# Patient Record
Sex: MALE | Race: WHITE | NOT HISPANIC OR LATINO | Employment: OTHER | URBAN - METROPOLITAN AREA
[De-identification: names, ages, dates, MRNs, and addresses within clinical notes are randomized per-mention and may not be internally consistent; named-entity substitution may affect disease eponyms.]

---

## 2020-01-23 ENCOUNTER — RECORDS - HEALTHEAST (OUTPATIENT)
Dept: LAB | Facility: CLINIC | Age: 65
End: 2020-01-23

## 2020-01-23 LAB
ALBUMIN SERPL-MCNC: 3.6 G/DL (ref 3.5–5)
ALP SERPL-CCNC: 101 U/L (ref 45–120)
ALT SERPL W P-5'-P-CCNC: 31 U/L (ref 0–45)
ANION GAP SERPL CALCULATED.3IONS-SCNC: 8 MMOL/L (ref 5–18)
AST SERPL W P-5'-P-CCNC: 23 U/L (ref 0–40)
BILIRUB SERPL-MCNC: 0.9 MG/DL (ref 0–1)
BUN SERPL-MCNC: 14 MG/DL (ref 8–22)
CALCIUM SERPL-MCNC: 8.9 MG/DL (ref 8.5–10.5)
CHLORIDE BLD-SCNC: 109 MMOL/L (ref 98–107)
CO2 SERPL-SCNC: 24 MMOL/L (ref 22–31)
CREAT SERPL-MCNC: 0.76 MG/DL (ref 0.7–1.3)
ERYTHROCYTE [SEDIMENTATION RATE] IN BLOOD BY WESTERGREN METHOD: 23 MM/HR (ref 0–15)
GFR SERPL CREATININE-BSD FRML MDRD: >60 ML/MIN/1.73M2
GLUCOSE BLD-MCNC: 105 MG/DL (ref 70–125)
POTASSIUM BLD-SCNC: 4.3 MMOL/L (ref 3.5–5)
PROT SERPL-MCNC: 6.6 G/DL (ref 6–8)
PSA SERPL-MCNC: 0.6 NG/ML (ref 0–4.5)
SODIUM SERPL-SCNC: 141 MMOL/L (ref 136–145)
TSH SERPL DL<=0.005 MIU/L-ACNC: 0.9 UIU/ML (ref 0.3–5)

## 2022-09-28 ENCOUNTER — LAB REQUISITION (OUTPATIENT)
Dept: LAB | Facility: CLINIC | Age: 67
End: 2022-09-28
Payer: COMMERCIAL

## 2022-09-28 DIAGNOSIS — Z12.5 ENCOUNTER FOR SCREENING FOR MALIGNANT NEOPLASM OF PROSTATE: ICD-10-CM

## 2022-09-28 DIAGNOSIS — Z13.220 ENCOUNTER FOR SCREENING FOR LIPOID DISORDERS: ICD-10-CM

## 2022-09-28 DIAGNOSIS — Z13.228 ENCOUNTER FOR SCREENING FOR OTHER METABOLIC DISORDERS: ICD-10-CM

## 2022-09-28 LAB
ALBUMIN SERPL BCG-MCNC: 4.2 G/DL (ref 3.5–5.2)
ALP SERPL-CCNC: 103 U/L (ref 40–129)
ALT SERPL W P-5'-P-CCNC: 67 U/L (ref 10–50)
ANION GAP SERPL CALCULATED.3IONS-SCNC: 13 MMOL/L (ref 7–15)
AST SERPL W P-5'-P-CCNC: 38 U/L (ref 10–50)
BILIRUB SERPL-MCNC: 0.4 MG/DL
BUN SERPL-MCNC: 11.9 MG/DL (ref 8–23)
CALCIUM SERPL-MCNC: 9.1 MG/DL (ref 8.8–10.2)
CHLORIDE SERPL-SCNC: 105 MMOL/L (ref 98–107)
CHOLEST SERPL-MCNC: 222 MG/DL
CREAT SERPL-MCNC: 0.87 MG/DL (ref 0.67–1.17)
DEPRECATED HCO3 PLAS-SCNC: 22 MMOL/L (ref 22–29)
GFR SERPL CREATININE-BSD FRML MDRD: >90 ML/MIN/1.73M2
GLUCOSE SERPL-MCNC: 110 MG/DL (ref 70–99)
HDLC SERPL-MCNC: 70 MG/DL
LDLC SERPL CALC-MCNC: 140 MG/DL
NONHDLC SERPL-MCNC: 152 MG/DL
POTASSIUM SERPL-SCNC: 4.8 MMOL/L (ref 3.4–5.3)
PROT SERPL-MCNC: 6.8 G/DL (ref 6.4–8.3)
PSA SERPL-MCNC: 0.98 NG/ML (ref 0–4.5)
SODIUM SERPL-SCNC: 140 MMOL/L (ref 136–145)
TRIGL SERPL-MCNC: 58 MG/DL

## 2022-09-28 PROCEDURE — 80061 LIPID PANEL: CPT | Mod: ORL | Performed by: FAMILY MEDICINE

## 2022-09-28 PROCEDURE — G0103 PSA SCREENING: HCPCS | Mod: ORL | Performed by: FAMILY MEDICINE

## 2022-09-28 PROCEDURE — 80053 COMPREHEN METABOLIC PANEL: CPT | Mod: ORL | Performed by: FAMILY MEDICINE

## 2023-01-11 ENCOUNTER — LAB REQUISITION (OUTPATIENT)
Dept: LAB | Facility: CLINIC | Age: 68
End: 2023-01-11
Payer: COMMERCIAL

## 2023-01-11 DIAGNOSIS — R20.0 ANESTHESIA OF SKIN: ICD-10-CM

## 2023-01-11 DIAGNOSIS — R79.89 OTHER SPECIFIED ABNORMAL FINDINGS OF BLOOD CHEMISTRY: ICD-10-CM

## 2023-01-11 DIAGNOSIS — E78.2 MIXED HYPERLIPIDEMIA: ICD-10-CM

## 2023-01-11 LAB
ALBUMIN SERPL BCG-MCNC: 4.1 G/DL (ref 3.5–5.2)
ALP SERPL-CCNC: 127 U/L (ref 40–129)
ALT SERPL W P-5'-P-CCNC: 32 U/L (ref 10–50)
AST SERPL W P-5'-P-CCNC: 22 U/L (ref 10–50)
BILIRUB DIRECT SERPL-MCNC: <0.2 MG/DL (ref 0–0.3)
BILIRUB SERPL-MCNC: 0.4 MG/DL
CHOLEST SERPL-MCNC: 283 MG/DL
FOLATE SERPL-MCNC: 7 NG/ML (ref 4.6–34.8)
HDLC SERPL-MCNC: 51 MG/DL
LDLC SERPL CALC-MCNC: 211 MG/DL
NONHDLC SERPL-MCNC: 232 MG/DL
PROT SERPL-MCNC: 7 G/DL (ref 6.4–8.3)
TRIGL SERPL-MCNC: 107 MG/DL
TSH SERPL DL<=0.005 MIU/L-ACNC: 1.72 UIU/ML (ref 0.3–4.2)
VIT B12 SERPL-MCNC: 456 PG/ML (ref 232–1245)

## 2023-01-11 PROCEDURE — 82607 VITAMIN B-12: CPT | Mod: ORL | Performed by: FAMILY MEDICINE

## 2023-01-11 PROCEDURE — 82746 ASSAY OF FOLIC ACID SERUM: CPT | Mod: ORL | Performed by: FAMILY MEDICINE

## 2023-01-11 PROCEDURE — 80076 HEPATIC FUNCTION PANEL: CPT | Mod: ORL | Performed by: FAMILY MEDICINE

## 2023-01-11 PROCEDURE — 84443 ASSAY THYROID STIM HORMONE: CPT | Mod: ORL | Performed by: FAMILY MEDICINE

## 2023-01-11 PROCEDURE — 80061 LIPID PANEL: CPT | Mod: ORL | Performed by: FAMILY MEDICINE

## 2023-06-09 ENCOUNTER — TRANSFERRED RECORDS (OUTPATIENT)
Dept: HEALTH INFORMATION MANAGEMENT | Facility: CLINIC | Age: 68
End: 2023-06-09

## 2023-06-28 ENCOUNTER — TRANSFERRED RECORDS (OUTPATIENT)
Dept: HEALTH INFORMATION MANAGEMENT | Facility: CLINIC | Age: 68
End: 2023-06-28
Payer: COMMERCIAL

## 2023-06-28 LAB — PHQ9 SCORE: 0

## 2023-07-03 ENCOUNTER — TRANSFERRED RECORDS (OUTPATIENT)
Dept: HEALTH INFORMATION MANAGEMENT | Facility: CLINIC | Age: 68
End: 2023-07-03
Payer: COMMERCIAL

## 2023-07-12 ENCOUNTER — TRANSCRIBE ORDERS (OUTPATIENT)
Dept: OTHER | Age: 68
End: 2023-07-12

## 2023-07-12 ENCOUNTER — MEDICAL CORRESPONDENCE (OUTPATIENT)
Dept: HEALTH INFORMATION MANAGEMENT | Facility: CLINIC | Age: 68
End: 2023-07-12
Payer: COMMERCIAL

## 2023-07-12 DIAGNOSIS — R10.10 PAIN OF UPPER ABDOMEN: Primary | ICD-10-CM

## 2023-07-12 DIAGNOSIS — M54.50 LOW BACK PAIN, UNSPECIFIED: ICD-10-CM

## 2023-07-12 DIAGNOSIS — M54.6 PAIN IN THORACIC SPINE: ICD-10-CM

## 2023-07-27 ENCOUNTER — OFFICE VISIT (OUTPATIENT)
Dept: PHYSICAL MEDICINE AND REHAB | Facility: CLINIC | Age: 68
End: 2023-07-27
Payer: COMMERCIAL

## 2023-07-27 VITALS — WEIGHT: 204 LBS | DIASTOLIC BLOOD PRESSURE: 69 MMHG | HEART RATE: 85 BPM | SYSTOLIC BLOOD PRESSURE: 111 MMHG

## 2023-07-27 DIAGNOSIS — M51.34 DEGENERATION OF INTERVERTEBRAL DISC OF THORACIC REGION WITH OSTEOPHYTE: ICD-10-CM

## 2023-07-27 DIAGNOSIS — M25.78 DEGENERATION OF INTERVERTEBRAL DISC OF THORACIC REGION WITH OSTEOPHYTE: ICD-10-CM

## 2023-07-27 DIAGNOSIS — M79.18 MYOFASCIAL PAIN: ICD-10-CM

## 2023-07-27 DIAGNOSIS — M54.14 RADICULAR PAIN OF THORACIC REGION: Primary | ICD-10-CM

## 2023-07-27 DIAGNOSIS — R10.11 RUQ ABDOMINAL PAIN: ICD-10-CM

## 2023-07-27 PROCEDURE — 99205 OFFICE O/P NEW HI 60 MIN: CPT | Performed by: PHYSICAL MEDICINE & REHABILITATION

## 2023-07-27 RX ORDER — ALBUTEROL SULFATE 90 UG/1
1 AEROSOL, METERED RESPIRATORY (INHALATION)
COMMUNITY
Start: 2023-03-17

## 2023-07-27 RX ORDER — ATORVASTATIN CALCIUM 20 MG/1
20 TABLET, FILM COATED ORAL
COMMUNITY
Start: 2023-01-13

## 2023-07-27 RX ORDER — ESCITALOPRAM OXALATE 10 MG/1
10 TABLET ORAL AT BEDTIME
COMMUNITY
Start: 2023-01-11

## 2023-07-27 RX ORDER — UMECLIDINIUM BROMIDE AND VILANTEROL TRIFENATATE 62.5; 25 UG/1; UG/1
1 POWDER RESPIRATORY (INHALATION)
COMMUNITY
Start: 2022-12-29

## 2023-07-27 RX ORDER — GABAPENTIN 100 MG/1
100 CAPSULE ORAL 3 TIMES DAILY
Qty: 90 CAPSULE | Refills: 1 | Status: SHIPPED | OUTPATIENT
Start: 2023-07-27 | End: 2023-08-15

## 2023-07-27 ASSESSMENT — PAIN SCALES - GENERAL: PAINLEVEL: EXTREME PAIN (9)

## 2023-07-27 NOTE — LETTER
7/27/2023         RE: Alexi Bruner  [address]  [facility] [address]        Dear Colleague,    Thank you for referring your patient, Alexi Bruner, to the [facility]. Please see a copy of my visit note below.    Assessment/Plan:      Alexi was seen today for back pain.    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 Approximate 4 months of right flank and thoracic radicular pain in a T10-11 distribution but does cross dermatomes into the abdomen right upper quadrant towards the umbilicus.  He has allodynia/hypersensitivity of the skin throughout the lower rib cage on the right towards the umbilicus.  He has a large anterior osteophyte T11-12 question fracture on new CT abdomen versus old CT chest.    2.  Myofascial pain and right upper quadrant pain.      3.  Intermittent paresthesias of the feet unknown ideology.    4.  Chronic L1 compression fracture likely incidental.    Discussion:    1.  I discussed the diagnosis and treatment options.  I reviewed all the images and [facility] including MRI of the thoracic spine lumbar spine and CT abdomen and pelvis.  We discussed medications and further imaging.    2.  CT thoracic spine to evaluate for fracture of the osteophyte at T11-12 or any other  osteophyte with potential fracture throughout the thoracic spine.    3.  Trial gabapentin for neuropathic pain 100 mg at bedtime increasing to 3 times daily.    4.  He has no disc herniation through the thoracic spine or lumbar spine that would result in his pain complaints and this may be referred from the abdomen and the etiology is not yet certain.    5.  Has been on nortriptyline 10 mg.  He can discontinue this if it is not helpful and try gabapentin.    6.  He may follow-up with me in 1 month      It was our pleasure caring for your patient today, if there any questions or concerns please do not hesitate to contact us.    60 minutes were spent on the date of the encounter performing chart review, patient visit and documentation in addition to any procedure.    Subjective:   Patient ID: Alexi Bruner is a 68 year old male.    History of Present Illness: Patient presents with his wife today for an evaluation of right-sided flank pain low back pain wrapping around the rib cage towards the right abdomen.  This started around March of this year.  No injury.  Awoke with severe pain on the left side initially in the left lower quadrant with extreme constipation.  Presented to urgent care was given an enema and referred to [facility] clinic reportedly had a colonoscopy and endoscopy diagnosed with H. pylori which was treated.  Throughout this time he has had some evolving pain and significant pain now through the right mid to upper lumbar spine lower thoracic spine radiating along around the T10 region to the right flank anteriorly towards the umbilicus with dysesthesias constant and paresthesias.  He describes constant burning pain throughout the skin and deep with right upper quadrant pain and occasional left lower quadrant pain.  Pain is constant worse with prolonged sitting and he has paresthesias in his feet with sitting which improved with standing and walking.  Changing positions seems to help.  Chiropractor has  offered some minimal benefit.  Has had an MRI of his thoracic and lumbar spine along with CT of his abdomen and pelvis MRI of abdomen.    I reviewed her note from Dr. New June 28, 2023.  Started on nortriptyline 10 mg at bedtime.  MRI thoracic and lumbar spine were ordered.  He reports nortriptyline is of no benefit for him.    I reviewed notes from urgent care/emergency department at [facility] from April 3 complaint of bloating diagnosed with constipation soapsuds enema was provided and started daily stool softeners.    Labs:January 2023 TSH 1.72 folate 7.0 B12 456, hepatic functions normal AST 22 ALT 32.    Imaging: MRI report and images were personally reviewed and discussed with the patient.  A plastic model was utilized during the discussion.  MRI of the lumbar spine thoracic spine reviewed along with the MRI report from the abdomen and pelvis.  Also personally reviewed the images of the CT abdomen and pelvis and compared to a chest CT from 2022.    The MRI lumbar spine shows chronic L1 compression fracture.  Degenerative changes T12-L1 severe no central canal or foraminal stenosis.  Minimal retrolisthesis L1-2 no central canal stenosis.  L2-3 degenerative T2 signal in the disc without any significant central canal or foraminal stenosis.  At L3-4 mild facet arthropathy no disc herniation or foraminal stenosis or central stenosis.  L4-5 no central stenosis mild facet arthropathy no foraminal stenosis.  L5-S1 severe disc height loss mild facet arthropathy and mild left foraminal stenosis prior right sided laminotomy.  Degenerative changes of the SI joints.    Thoracic MRI shows facet arthropathy T9-10 through T10-11 and T11-12.  Chronic anterior wedge fracture L1.  2 mm spondylolisthesis C7-T1 with moderate left facet arthropathy.  There is a right disc osteophyte at T11-12.  No high-grade central canal stenosis or foraminal stenosis in the thoracic spine.    MRI of the abdomen shows no acute findings  bladder unremarkable, no lymphadenopathy.  No focal masses in the spleen adrenal glands, pancreas.    CT abdomen and pelvis revealed no acute abnormalities.  I did review the spine showing anterior osteophyte on the right at T11-12.  There may be  questionable fracture of that osteophyte when compared to CT of the chest from 2022.       Review of Systems: Complains of feet swelling, abdominal pain.  Denies fever, weight loss, waking, headache, change in vision, chest pain, shortness of breath, nausea, vomiting, bowel or bladder incontinence, skin issues, balance issues. Remainder of 12 point review systems negative unless listed above.    Past Medical History:   Diagnosis Date     Anxiety      Hyperlipidemia        Family History   Problem Relation Age of Onset     No Known Problems Mother      Diabetes Paternal Uncle          Social History     Socioeconomic History     Marital status:      Spouse name: None     Number of children: None     Years of education: None     Highest education level: None   Tobacco Use     Smoking status: Every Day     Types: Cigarettes     Smokeless tobacco: Never   Substance and Sexual Activity     Drug use: Not Currently     Social history: .  Retired.  Smokes cigarettes.  No alcohol.    The following portions of the patient's history were reviewed and updated as appropriate: allergies, current medications, past family history, past medical history, past social history, past surgical history and problem list.    Oswestry (KATHI) Questionnaire        7/27/2023     2:22 PM   OSWESTRY DISABILITY INDEX   Count 10   Sum 9   Oswestry Score (%) 18 %       Neck Disability Index:       No data to display                   PHQ-2 Score:         7/27/2023     2:21 PM   PHQ-2 ( 1999 [employer])   Q1: Little interest or pleasure in doing things 0   Q2: Feeling down, depressed or hopeless 0   PHQ-2 Score 0                  Objective:   Physical Exam:    /69   Pulse 85   Wt 204 lb  (92.5 kg)   There is no height or weight on file to calculate BMI.    General:  Well-appearing male in no acute distress.  Pleasant,   cooperative, and interactive throughout the examination and interview.  CV: No lower extremity edema.  Distal pulses 2+ bilateral lower extremities.  Lymphatics: No cervical lymphadenopathy palpated.  Eyes: sclera clear.  Skin: No rashes or lesions seen.  Respirations unlabored.  MSK: Gait is nonantalgic.  Able to heel-toe walk without difficulty.    Negative Romberg.  Spine: normal AP curves of the C, T, and L spine.  Mildly reduced lumbar flexion finger to floor testing.  Full range of motion cervical spine all planes.  Palpation: Tenderness to palpation over right-sided thoracic spine around T10, 11, 12 over the posterior rib angles and along the right ribs.  Hypersensitive to light touch throughout the right ribs.  Extremities: Full range of motion of the shoulders, elbows, and wrists with no effusions or tenderness to palpation.  Negative arm drop, empty can, and Speed's test bilaterally.    Full range of motion of the hips, knees, and ankles from a seated position with no effusions or tenderness to palpation.    Neurologic exam: Mental status: Patient is alert and oriented with normal affect.  Attention, knowledge, memory, and language are intact.  Normal coordination throughout the examination.  Reflexes are 2+ and symmetric biceps, triceps, brachioradialis, patellar, and Achilles with down-going toes and Negative Kelly's.  Sensation is intact to light touch throughout the upper and lower extremities bilaterally.  Allodynic or hypersensitive to light touch along the right rib 10 and along T10 dermatome but also T11 and 12 and up to to the right flank.  Manual muscle testing reveals 5 out of 5 strength in the shoulder abductors, elbow   flexors/extensors, wrist extensors, interosseous, and finger flexors; 5 out of 5   in the hip flexors, knee flexors/extensors, ankle plantar  flexors, ankle   dorsiflexors, and EHL.  Normal muscle bulk and tone.   Negative seated   straight leg raise bilaterally.  Abdomen soft nontender throughout.        Again, thank you for allowing me to participate in the care of your patient.        Sincerely,        Ankit Brumfield, DO

## 2023-07-27 NOTE — PATIENT INSTRUCTIONS
An CT was ordered for you today.  You will be contacted by scheduling within 3 days.    If you are not contacted, please call Radiology at [phone].     Prescribed Gabapentin today, 100 mg tablets, to be titrated up to 3 tablets 3 times a day as tolerated for your nerve pain. Please follow Gabapentin dosing chart below.    Gabapentin 100mg Dosing Chart    DATE  MORNING AFTERNOON BEDTIME    Day 1 0 0 1    Day 2 0 0 1    Day 3 0 0 1    Day 4 1 0 1    Day 5 1 0 1    Day 6 1 0 1    Day 7 1 1 1    Day 8 1 1 1    Day 9 1 1 1    Day 10 1 1 1    Day 11 1 1 1    Day 12        Day 13       Day 14       Day 15       Day 16       Day 17       Day 18       Day 19       Day 20       Day 21       Day 22       Day 23       Day 24       Day 25       Day 26       Day 27        Continue medication, taking 3 capsules three times daily    Please call if you have any questions regarding how to take your medication  Clinic Phone # [phone]

## 2023-07-27 NOTE — PROGRESS NOTES
Assessment/Plan:      Alexi was seen today for back pain.    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 Approximate 4 months of right flank and thoracic radicular pain in a T10-11 distribution but does cross dermatomes into the abdomen right upper quadrant towards the umbilicus.  He has allodynia/hypersensitivity of the skin throughout the lower rib cage on the right towards the umbilicus.  He has a large anterior osteophyte T11-12 question fracture on new CT abdomen versus old CT chest.    2.  Myofascial pain and right upper quadrant pain.      3.  Intermittent paresthesias of the feet unknown ideology.    4.  Chronic L1 compression fracture likely incidental.    Discussion:    1.  I discussed the diagnosis and treatment options.  I reviewed all the images and [facility] including MRI of the thoracic spine lumbar spine and CT abdomen and pelvis.  We discussed medications and further imaging.    2.  CT thoracic spine to evaluate for fracture of the osteophyte at T11-12 or any other osteophyte with potential fracture throughout the thoracic spine.    3.  Trial gabapentin for neuropathic pain 100 mg at bedtime increasing to 3 times daily.    4.  He has no disc herniation through the thoracic spine or lumbar spine that would result in his  pain complaints and this may be referred from the abdomen and the etiology is not yet certain.    5.  Has been on nortriptyline 10 mg.  He can discontinue this if it is not helpful and try gabapentin.    6.  He may follow-up with me in 1 month      It was our pleasure caring for your patient today, if there any questions or concerns please do not hesitate to contact us.    60 minutes were spent on the date of the encounter performing chart review, patient visit and documentation in addition to any procedure.    Subjective:   Patient ID: Alexi Bruner is a 68 year old male.    History of Present Illness: Patient presents with his wife today for an evaluation of right-sided flank pain low back pain wrapping around the rib cage towards the right abdomen.  This started around March of this year.  No injury.  Awoke with severe pain on the left side initially in the left lower quadrant with extreme constipation.  Presented to urgent care was given an enema and referred to [facility] clinic reportedly had a colonoscopy and endoscopy diagnosed with H. pylori which was treated.  Throughout this time he has had some evolving pain and significant pain now through the right mid to upper lumbar spine lower thoracic spine radiating along around the T10 region to the right flank anteriorly towards the umbilicus with dysesthesias constant and paresthesias.  He describes constant burning pain throughout the skin and deep with right upper quadrant pain and occasional left lower quadrant pain.  Pain is constant worse with prolonged sitting and he has paresthesias in his feet with sitting which improved with standing and walking.  Changing positions seems to help.  Chiropractor has offered some minimal benefit.  Has had an MRI of his thoracic and lumbar spine along with CT of his abdomen and pelvis MRI of abdomen.    I reviewed her note from Dr. New June 28, 2023.  Started on nortriptyline 10 mg at bedtime.  MRI thoracic and lumbar  spine were ordered.  He reports nortriptyline is of no benefit for him.    I reviewed notes from urgent care/emergency department at [facility] from April 3 complaint of bloating diagnosed with constipation soapsuds enema was provided and started daily stool softeners.    Labs:January 2023 TSH 1.72 folate 7.0 B12 456, hepatic functions normal AST 22 ALT 32.    Imaging: MRI report and images were personally reviewed and discussed with the patient.  A plastic model was utilized during the discussion.  MRI of the lumbar spine thoracic spine reviewed along with the MRI report from the abdomen and pelvis.  Also personally reviewed the images of the CT abdomen and pelvis and compared to a chest CT from 2022.    The MRI lumbar spine shows chronic L1 compression fracture.  Degenerative changes T12-L1 severe no central canal or foraminal stenosis.  Minimal retrolisthesis L1-2 no central canal stenosis.  L2-3 degenerative T2 signal in the disc without any significant central canal or foraminal stenosis.  At L3-4 mild facet arthropathy no disc herniation or foraminal stenosis or central stenosis.  L4-5 no central stenosis mild facet arthropathy no foraminal stenosis.  L5-S1 severe disc height loss mild facet arthropathy and mild left foraminal stenosis prior right sided laminotomy.  Degenerative changes of the SI joints.    Thoracic MRI shows facet arthropathy T9-10 through T10-11 and T11-12.  Chronic anterior wedge fracture L1.  2 mm spondylolisthesis C7-T1 with moderate left facet arthropathy.  There is a right disc osteophyte at T11-12.  No high-grade central canal stenosis or foraminal stenosis in the thoracic spine.    MRI of the abdomen shows no acute findings bladder unremarkable, no lymphadenopathy.  No focal masses in the spleen adrenal glands, pancreas.    CT abdomen and pelvis revealed no acute abnormalities.  I did review the spine showing anterior osteophyte on the right at T11-12.  There may be  questionable fracture  of that osteophyte when compared to CT of the chest from 2022.       Review of Systems: Complains of feet swelling, abdominal pain.  Denies fever, weight loss, waking, headache, change in vision, chest pain, shortness of breath, nausea, vomiting, bowel or bladder incontinence, skin issues, balance issues. Remainder of 12 point review systems negative unless listed above.    Past Medical History:   Diagnosis Date    Anxiety     Hyperlipidemia        Family History   Problem Relation Age of Onset    No Known Problems Mother     Diabetes Paternal Uncle          Social History     Socioeconomic History    Marital status:      Spouse name: None    Number of children: None    Years of education: None    Highest education level: None   Tobacco Use    Smoking status: Every Day     Types: Cigarettes    Smokeless tobacco: Never   Substance and Sexual Activity    Drug use: Not Currently     Social history: .  Retired.  Smokes cigarettes.  No alcohol.    The following portions of the patient's history were reviewed and updated as appropriate: allergies, current medications, past family history, past medical history, past social history, past surgical history and problem list.    Oswestry (KATHI) Questionnaire        7/27/2023     2:22 PM   OSWESTRY DISABILITY INDEX   Count 10   Sum 9   Oswestry Score (%) 18 %       Neck Disability Index:       No data to display                   PHQ-2 Score:         7/27/2023     2:21 PM   PHQ-2 ( 1999 [employer])   Q1: Little interest or pleasure in doing things 0   Q2: Feeling down, depressed or hopeless 0   PHQ-2 Score 0                  Objective:   Physical Exam:    /69   Pulse 85   Wt 204 lb (92.5 kg)   There is no height or weight on file to calculate BMI.    General:  Well-appearing male in no acute distress.  Pleasant,   cooperative, and interactive throughout the examination and interview.  CV: No lower extremity edema.  Distal pulses 2+ bilateral lower extremities.   Lymphatics: No cervical lymphadenopathy palpated.  Eyes: sclera clear.  Skin: No rashes or lesions seen.  Respirations unlabored.  MSK: Gait is nonantalgic.  Able to heel-toe walk without difficulty.    Negative Romberg.  Spine: normal AP curves of the C, T, and L spine.  Mildly reduced lumbar flexion finger to floor testing.  Full range of motion cervical spine all planes.  Palpation: Tenderness to palpation over right-sided thoracic spine around T10, 11, 12 over the posterior rib angles and along the right ribs.  Hypersensitive to light touch throughout the right ribs.  Extremities: Full range of motion of the shoulders, elbows, and wrists with no effusions or tenderness to palpation.  Negative arm drop, empty can, and Speed's test bilaterally.    Full range of motion of the hips, knees, and ankles from a seated position with no effusions or tenderness to palpation.    Neurologic exam: Mental status: Patient is alert and oriented with normal affect.  Attention, knowledge, memory, and language are intact.  Normal coordination throughout the examination.  Reflexes are 2+ and symmetric biceps, triceps, brachioradialis, patellar, and Achilles with down-going toes and Negative Kelly's.  Sensation is intact to light touch throughout the upper and lower extremities bilaterally.  Allodynic or hypersensitive to light touch along the right rib 10 and along T10 dermatome but also T11 and 12 and up to to the right flank.  Manual muscle testing reveals 5 out of 5 strength in the shoulder abductors, elbow   flexors/extensors, wrist extensors, interosseous, and finger flexors; 5 out of 5   in the hip flexors, knee flexors/extensors, ankle plantar flexors, ankle   dorsiflexors, and EHL.  Normal muscle bulk and tone.   Negative seated   straight leg raise bilaterally.  Abdomen soft nontender throughout.

## 2023-08-01 ENCOUNTER — HOSPITAL ENCOUNTER (OUTPATIENT)
Dept: CT IMAGING | Facility: HOSPITAL | Age: 68
Discharge: HOME | End: 2023-08-01
Attending: PHYSICAL MEDICINE & REHABILITATION | Admitting: PHYSICAL MEDICINE & REHABILITATION
Payer: COMMERCIAL

## 2023-08-01 DIAGNOSIS — M51.34 DEGENERATION OF INTERVERTEBRAL DISC OF THORACIC REGION WITH OSTEOPHYTE: ICD-10-CM

## 2023-08-01 DIAGNOSIS — M54.14 RADICULAR PAIN OF THORACIC REGION: ICD-10-CM

## 2023-08-01 DIAGNOSIS — M25.78 DEGENERATION OF INTERVERTEBRAL DISC OF THORACIC REGION WITH OSTEOPHYTE: ICD-10-CM

## 2023-08-01 DIAGNOSIS — R10.11 RUQ ABDOMINAL PAIN: ICD-10-CM

## 2023-08-01 PROCEDURE — 72128 CT CHEST SPINE W/O DYE: CPT

## 2023-08-02 ENCOUNTER — TELEPHONE (OUTPATIENT)
Dept: PHYSICAL MEDICINE AND REHAB | Facility: CLINIC | Age: 68
End: 2023-08-02
Payer: COMMERCIAL

## 2023-08-02 NOTE — TELEPHONE ENCOUNTER
----- Message from Ankit Brumfield DO sent at 8/2/2023 12:32 PM CDT -----  Inform the patient that there is a chronic L1 compression fracture with some bone spur formation above the fracture.  I do not appreciate any fracture of the bone spurs in that area.  His pain    His pain is consistent with neuropathic pain or nerve related pain but I do not have a good cause at this time.  I would recommend that he continue with the trial of gabapentin and we can slowly increase this as tolerated to see if that will be helpful for making his pain more tolerable until follow-up.

## 2023-08-02 NOTE — TELEPHONE ENCOUNTER
Call placed to patient with provider's results and recommendations.  Pt stated understanding.    DISPLAY PLAN FREE TEXT

## 2023-08-15 ENCOUNTER — THERAPY VISIT (OUTPATIENT)
Dept: PHYSICAL THERAPY | Facility: CLINIC | Age: 68
End: 2023-08-15
Attending: PHYSICAL MEDICINE & REHABILITATION
Payer: COMMERCIAL

## 2023-08-15 ENCOUNTER — OFFICE VISIT (OUTPATIENT)
Dept: PHYSICAL MEDICINE AND REHAB | Facility: CLINIC | Age: 68
End: 2023-08-15
Payer: COMMERCIAL

## 2023-08-15 VITALS — SYSTOLIC BLOOD PRESSURE: 127 MMHG | DIASTOLIC BLOOD PRESSURE: 78 MMHG | HEART RATE: 68 BPM

## 2023-08-15 DIAGNOSIS — M54.50 CHRONIC BILATERAL LOW BACK PAIN WITHOUT SCIATICA: Primary | ICD-10-CM

## 2023-08-15 DIAGNOSIS — G89.29 CHRONIC BILATERAL LOW BACK PAIN WITHOUT SCIATICA: Primary | ICD-10-CM

## 2023-08-15 DIAGNOSIS — M54.6 PAIN IN THORACIC SPINE: ICD-10-CM

## 2023-08-15 DIAGNOSIS — R10.11 RUQ ABDOMINAL PAIN: ICD-10-CM

## 2023-08-15 DIAGNOSIS — I71.40 ABDOMINAL AORTIC ANEURYSM (AAA) WITHOUT RUPTURE, UNSPECIFIED PART (H): ICD-10-CM

## 2023-08-15 DIAGNOSIS — S32.010S COMPRESSION FRACTURE OF L1 VERTEBRA, SEQUELA: ICD-10-CM

## 2023-08-15 DIAGNOSIS — M79.18 MYOFASCIAL PAIN: ICD-10-CM

## 2023-08-15 DIAGNOSIS — M54.14 RADICULAR PAIN OF THORACIC REGION: ICD-10-CM

## 2023-08-15 DIAGNOSIS — M51.34 DEGENERATION OF INTERVERTEBRAL DISC OF THORACIC REGION WITH OSTEOPHYTE: ICD-10-CM

## 2023-08-15 DIAGNOSIS — M54.14 RADICULAR PAIN OF THORACIC REGION: Primary | ICD-10-CM

## 2023-08-15 DIAGNOSIS — M25.78 DEGENERATION OF INTERVERTEBRAL DISC OF THORACIC REGION WITH OSTEOPHYTE: ICD-10-CM

## 2023-08-15 PROCEDURE — 99214 OFFICE O/P EST MOD 30 MIN: CPT | Performed by: PHYSICAL MEDICINE & REHABILITATION

## 2023-08-15 PROCEDURE — 97162 PT EVAL MOD COMPLEX 30 MIN: CPT | Mod: GP | Performed by: PHYSICAL THERAPIST

## 2023-08-15 PROCEDURE — 97110 THERAPEUTIC EXERCISES: CPT | Mod: GP | Performed by: PHYSICAL THERAPIST

## 2023-08-15 RX ORDER — GABAPENTIN 100 MG/1
CAPSULE ORAL
Qty: 120 CAPSULE | Refills: 1 | Status: SHIPPED | OUTPATIENT
Start: 2023-08-15 | End: 2024-04-22

## 2023-08-15 ASSESSMENT — PAIN SCALES - GENERAL: PAINLEVEL: MILD PAIN (3)

## 2023-08-15 NOTE — LETTER
8/15/2023         RE: Alexi Bruner  [address] [facility] [address]        Dear Colleague,    Thank you for referring your patient, Alexi Bruner, to the [facility]. Please see a copy of my visit note below.    Assessment/Plan:      Alexi was seen today for back pain.    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  -     Physical Therapy Referral; Future    RUQ abdominal pain  -     Physical Therapy Referral; Future    Abdominal aortic aneurysm (AAA) without rupture, unspecified part (H)  -     Physical Therapy Referral; Future  -     Vascular Medicine Referral; Future    Myofascial pain  -     Physical Therapy Referral; Future  -     gabapentin (NEURONTIN) 100 MG capsule; 100mg in the morning and afternoon and 200mg at bedtime    Compression fracture of L1 vertebra, sequela  -     Physical Therapy Referral; Future         Assessment: Pleasant 68 year old male with a history of hyperlipidemia and anxiety with:        1. Approximate 4 to 6-month history of right flank and thoracic radicular pain in a T10-11 distribution but does cross dermatomes into the abdomen right upper quadrant towards the umbilicus.  He has allodynia/hypersensitivity of the skin throughout the lower rib cage on the right towards the umbilicus.  He has a large anterior osteophyte T11-12 no fracture on thoracic spine.  Mild foraminal stenosis T10-11 with a chronic L1 compression fracture on CT scan.  He is also having some left L1 radicular symptoms today.   2.  Myofascial pain and right upper quadrant pain.       3.  Intermittent paresthesias of the feet unknown ideology.     4.  Chronic L1  compression fracture likely incidental.  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    5.  On my review of the recent CT thoracic spine and CT abdomen there is some aortic ectopy in the mid thoracic spine versus abdominal aortic aneurysm.    Discussion:    1.  We discussed the diagnosis and treatment options which may include injections along with changing gabapentin may have slight improvement with gabapentin.  We discussed vascular evaluation as well given the atypical presentation of his symptoms and potential lumbar epidural.       2.  Recommend T12-L1 interlaminar epidural steroid injection.    2.  Increase gabapentin 100 mg in the morning and afternoon 200 mg at bedtime.    3.  I would like him to see Dr. Laboy in vascular clinic to evaluate for atypical vascular presentation given his  aortic aneurysm versus ectopy which appears relatively mild.    4.  Start physical therapy for lumbar core strengthening stabilization.    5.  Follow-up with me 1 to 2 months after injection.  It was our pleasure caring for your patient today, if there any questions or concerns please do not hesitate to contact us.      Subjective:   Patient ID: Alexi Bruner is a 68 year old male.    History of Present Illness: Patient presents for follow-up of lumbar spine pain abdominal pain thoracic pain.  Having some radiation of the left pain into the left upper iliac crest along L1 distribution.  Also right sided thoracic pain through the entirety of the posterior chest wall/ribs in the axilla and into the upper abdomen on the right.  Pain waxes and wanes no change overall tolerating gabapentin without side effects 100 mg 3 times daily does cause some drowsiness but tolerating it.  Pain is a 9/10 at worst 3/10 today and at best.  New CT scan of the thoracic spine has been performed.  Symptoms are better with laying and sleeping on his right side.  Sees a chiropractor which helps for a couple  of days at a time sees him twice a week.  Also has some paresthesias of the lower extremities with sitting for too long or sitting with his legs crossed.    Imaging: CT imaging of the thoracic spine imaging report personally reviewed.  Chronic L1 compression fracture ankylosis T12-L1 endplates.  No fractures.  Mild T9-10 T10-11 foraminal stenosis no central stenosis.  Slight right thoracic scoliotic curve.T8-9 and right lateral osteophyte near bridging T11-12 right lateral osteophyte no fracture noted.    Review of Systems: Pertinent positives: Bilateral lower extremity paresthesias diffusely throughout the legs with sitting for too long or crossing his legs.  Pertinent negatives: No weakness.  No bowel or bladder incontinence.  No urinary retention.  No fevers, unintentional weight loss, balance changes, headaches, frequent falling, difficulty swallowing, or coordination difficulties.  All others reviewed are negative.       Past Medical History:   Diagnosis Date     Anxiety      Hyperlipidemia        The following portions of the patient's history were reviewed and updated as appropriate: allergies, current medications, past family history, past medical history, past social history, past surgical history and problem list.           Objective:   Physical Exam:    /78   Pulse 68   There is no height or weight on file to calculate BMI.      General: Alert and oriented with normal affect. Attention, knowledge, memory, and language are intact. No acute distress.     Respirations: Unlabored. CV: No lower extremity edema.  Skin: No rashes seen Over the thoracic spine and abdomen  Gait:  Nonantalgic  Distal pulses 2+ bilateral lower extremities  Sensation is intact to light touch throughout the   lower extremities.  Reflexes are   2+ patellar and Achilles      Manual muscle testing reveals:  Right /Left out of 5     5/5 hip flexors  5/5 knee flexors  5/5 knee extensors  5/5 ankle plantar flexors  5/5 ankle  dorsiflexors  5/5  L        Again, thank you for allowing me to participate in the care of your patient.        Sincerely,        Ankit Brumfield, DO   No oral lesions; no gross abnormalities

## 2023-08-15 NOTE — PROGRESS NOTES
Assessment/Plan:      Alexi was seen today for back pain.    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  -     Physical Therapy Referral; Future    RUQ abdominal pain  -     Physical Therapy Referral; Future    Abdominal aortic aneurysm (AAA) without rupture, unspecified part (H)  -     Physical Therapy Referral; Future  -     Vascular Medicine Referral; Future    Myofascial pain  -     Physical Therapy Referral; Future  -     gabapentin (NEURONTIN) 100 MG capsule; 100mg in the morning and afternoon and 200mg at bedtime    Compression fracture of L1 vertebra, sequela  -     Physical Therapy Referral; Future         Assessment: Pleasant 68 year old male with a history of hyperlipidemia and anxiety with:        1. Approximate 4 to 6-month history of right flank and thoracic radicular pain in a T10-11 distribution but does cross dermatomes into the abdomen right upper quadrant towards the umbilicus.  He has allodynia/hypersensitivity of the skin throughout the lower rib cage on the right towards the umbilicus.  He has a large anterior osteophyte T11-12 no fracture on thoracic spine.  Mild foraminal stenosis T10-11 with a chronic L1 compression fracture on CT scan.  He is also having some left L1 radicular symptoms today.   2.  Myofascial pain and right upper quadrant pain.       3.  Intermittent paresthesias of the feet unknown ideology.     4.  Chronic L1 compression fracture likely incidental.  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    5.  On my review of the recent CT thoracic spine and CT abdomen there is some aortic ectopy in the mid thoracic spine  versus abdominal aortic aneurysm.    Discussion:    1.  We discussed the diagnosis and treatment options which may include injections along with changing gabapentin may have slight improvement with gabapentin.  We discussed vascular evaluation as well given the atypical presentation of his symptoms and potential lumbar epidural.       2.  Recommend T12-L1 interlaminar epidural steroid injection.    2.  Increase gabapentin 100 mg in the morning and afternoon 200 mg at bedtime.    3.  I would like him to see Dr. Laboy in vascular clinic to evaluate for atypical vascular presentation given his  aortic aneurysm versus ectopy which appears relatively mild.    4.  Start physical therapy for lumbar core strengthening stabilization.    5.  Follow-up with me 1 to 2 months after injection.  It was our pleasure caring for your patient today, if there any questions or concerns please do not hesitate to contact us.      Subjective:   Patient ID: Alexi Bruner is a 68 year old male.    History of Present Illness: Patient presents for follow-up of lumbar spine pain abdominal pain thoracic pain.  Having some radiation of the left pain into the left upper iliac crest along L1 distribution.  Also right sided thoracic pain through the entirety of the posterior chest wall/ribs in the axilla and into the upper abdomen on the right.  Pain waxes and wanes no change overall tolerating gabapentin without side effects 100 mg 3 times daily does cause some drowsiness but tolerating it.  Pain is a 9/10 at worst 3/10 today and at best.  New CT scan of the thoracic spine has been performed.  Symptoms are better with laying and sleeping on his right side.  Sees a chiropractor which helps for a couple of days at a time sees him twice a week.  Also has some paresthesias of the lower extremities with sitting for too long or sitting with his legs crossed.    Imaging: CT imaging of the thoracic spine imaging report personally reviewed.  Chronic L1  compression fracture ankylosis T12-L1 endplates.  No fractures.  Mild T9-10 T10-11 foraminal stenosis no central stenosis.  Slight right thoracic scoliotic curve.T8-9 and right lateral osteophyte near bridging T11-12 right lateral osteophyte no fracture noted.    Review of Systems: Pertinent positives: Bilateral lower extremity paresthesias diffusely throughout the legs with sitting for too long or crossing his legs.  Pertinent negatives: No weakness.  No bowel or bladder incontinence.  No urinary retention.  No fevers, unintentional weight loss, balance changes, headaches, frequent falling, difficulty swallowing, or coordination difficulties.  All others reviewed are negative.       Past Medical History:   Diagnosis Date    Anxiety     Hyperlipidemia        The following portions of the patient's history were reviewed and updated as appropriate: allergies, current medications, past family history, past medical history, past social history, past surgical history and problem list.           Objective:   Physical Exam:    /78   Pulse 68   There is no height or weight on file to calculate BMI.      General: Alert and oriented with normal affect. Attention, knowledge, memory, and language are intact. No acute distress.     Respirations: Unlabored. CV: No lower extremity edema.  Skin: No rashes seen Over the thoracic spine and abdomen  Gait:  Nonantalgic  Distal pulses 2+ bilateral lower extremities  Sensation is intact to light touch throughout the   lower extremities.  Reflexes are   2+ patellar and Achilles      Manual muscle testing reveals:  Right /Left out of 5     5/5 hip flexors  5/5 knee flexors  5/5 knee extensors  5/5 ankle plantar flexors  5/5 ankle dorsiflexors  5/5  EHL

## 2023-08-15 NOTE — PROGRESS NOTES
"PHYSICAL THERAPY EVALUATION  Type of Visit: Evaluation    See electronic medical record for Abuse and Falls Screening details.    Subjective       Presenting condition or subjective complaint: side pain, hot spot in back    The patient reports to therapy with a chief complaint of mid and low back pain. Sees chiropractor 2x/week and is sore that day and feels ok. Pain wraps around the L side into the lower abdominal area, R side is lower back and goes up the R upper back. Patient reports that when seeing the doctor today, a AAA that is unrupruptured found on CT scan--is seeing a vascular MD for this. Gets pain and \"electricity\" that runs along either side of the abdomen.     CT Scan:   IMPRESSION:  1.  No acute abnormality.  2.  Chronic 30% L1 compression. Associated ankylosis of the T12-L1 apposing endplates and facets.  3.  Mild T9-T10 and T10-T11 neural foraminal stenoses.  4.  No spinal canal stenosis.  5.  No change.    Lumbar MRI:   L5-S1 severe degenerative disc disease and mild ney facet arthropathy, L foraminal stenosis     Date of onset: 08/15/23 (MD order)    Relevant medical history: COPD; Smoking   Dates & types of surgery: Back surgery 35 years ago, shoulder surgery 8 years ago, hernia surgery 20 years ago    Prior diagnostic imaging/testing results: MRI; CT scan     Prior therapy history for the same diagnosis, illness or injury: Yes chiropractic care 2x/week    Employment:   retired  Hobbies/Interests: lawn and gardening, coin collecting    Patient goals for therapy: spend more time being productive    Pain assessment:      Objective     LUMBAR:    Posture: kyphotic, cervical protrusion, rounded shoulders, rib flare noted     Gait: WNL    Neurological:    Motor Deficit:  Myotomes L R   L1-2 (hip flexion) 5 5   L3 (knee extension) 5 5   L4 (ankle DF) 5 5   L5 (g. toe ext) 5 5   S1 (ankle PF or knee flex) 5 5     Sensory Deficit, Reflexes: WNL on exam today    Dural Signs:   L R   Slump - -   SLR   "       AROM: (min, mod, max, + indicates positive pain)  Movement ROM    Flexion Reaches floor painfree, no reproduction of symptoms    Extension Min limited painfree, no reproduction of symptoms    Side Gliding/Bend L Finger tips to mid thigh painfree, stiffness in LB    Side Gliding/Bend R Finger tips to mid thigh, painfree, stiffness in LB      Thoracic mobility: rotation mod limited due to stiffness, extension max limited, flexion min limited stretch   Core/Gluteal Strength: difficulty activation lower adominals     Lumbar Mobility/Spring Testing: hypomobile lower thoracic and lumbar spine     Palpation: tender to ney thoracolumbar paraspinals, R sided thoracic musculature most tender and does create some radiating pain to anterolateral R abdomen    Assessment & Plan   CLINICAL IMPRESSIONS  Medical Diagnosis: Radicular pain of thoracic region  Degeneration of intervertebral disc of thoracic region with osteophyte  RUQ abdominal pain  Abdominal aortic aneurysm (AAA) without rupture, unspecified part (H)  Myofascial pain  Compression fracture of L1 vertebra, sequela    Treatment Diagnosis: Mid and Low Back Pain   Impression/Assessment: Patient is a 68 year old male with mid/low back complaints.  The following significant findings have been identified: Pain, Decreased ROM/flexibility, Decreased joint mobility, Decreased strength, Inflammation, Impaired muscle performance, and Impaired posture. These impairments interfere with their ability to perform self care tasks, work tasks, recreational activities, household chores, driving , household mobility, and community mobility as compared to previous level of function.     Clinical Decision Making (Complexity):  Clinical Presentation: Evolving/Changing  Clinical Presentation Rationale: based on medical and personal factors listed in PT evaluation  Clinical Decision Making (Complexity): Moderate complexity    PLAN OF CARE  Treatment Interventions:  Modalities:  E-stim  Interventions: Gait Training, Manual Therapy, Neuromuscular Re-education, Therapeutic Activity, Therapeutic Exercise, Self-Care/Home Management    Long Term Goals     PT Goal 1  Goal Identifier: Sleeping  Goal Description: Patient will be able to sleep through the night without waking due to pain to improve quality of sleep  Target Date: 10/10/23  PT Goal 2  Goal Identifier: Prolonged positioning  Goal Description: Patient will be able to sit or stand for at least 60 minutes without reproductino of pain to improve ease with ADLs and transportation  Target Date: 10/10/23      Frequency of Treatment: 1x/week  Duration of Treatment: 8 weeks    Recommended Referrals to Other Professionals:   Education Assessment:   Learner/Method: Patient  Education Comments: Eager to participate in therapy    Risks and benefits of evaluation/treatment have been explained.   Patient/Family/caregiver agrees with Plan of Care.     Evaluation Time:     PT Eval, Moderate Complexity Minutes (10860): 20       Signing Clinician: CHICHO CURRAN, PT      [facility]                                                                                   OUTPATIENT PHYSICAL THERAPY      PLAN OF TREATMENT FOR OUTPATIENT REHABILITATION   Patient's Last Name, First Name, Alexi Be YOB: 1955   Provider's Name   [facility]   Medical Record No.  [id number]     Onset Date: 08/15/23 (MD order)  Start of Care Date: 08/15/23     Medical Diagnosis:  Radicular pain of thoracic region  Degeneration of intervertebral disc of thoracic region with osteophyte  RUQ abdominal pain  Abdominal aortic aneurysm (AAA) without rupture, unspecified part (H)  Myofascial pain  Compression fracture of L1 vertebra, sequela      PT Treatment Diagnosis:  Mid and Low Back Pain Plan of Treatment  Frequency/Duration: 1x/week/ 8 weeks    Certification date from 08/15/23 to 10/10/23          See note for plan of treatment details and functional goals     CHICHO CURRAN, PT                         I CERTIFY THE NEED FOR THESE SERVICES FURNISHED UNDER        THIS PLAN OF TREATMENT AND WHILE UNDER MY CARE     (Physician attestation of this document indicates review and certification of the therapy plan).                Referring Provider:  Ankit Brumfield      Initial Assessment  See Epic Evaluation- Start of Care Date: 08/15/23

## 2023-08-15 NOTE — PATIENT INSTRUCTIONS
A physical therapy order was provided for you today.  You will be contacted by physical therapy.  If nobody contacts you within 3 to 5 days, please contact the clinic at [phone].  It will be very important for you to do your physical therapy exercises on a regular basis to decrease your pain and prevent future pain flares.   A Epidural injection has been ordered today. Please schedule this injection at least 2 weeks from now to allow time for insurance prior authorization. On the day of your injection, you cannot be sick or taking antibiotics. If you become sick and are prescribed, please call the clinic so your injection can be rescheduled for once you have completed your antibiotics. You will need to bring a  with you for your injection. If you have any questions or concerns prior to your injection, please do not hesitate to call the nurse navigation line at [phone].   See Dr Laboy in vascular medicine  Increase gabapentin to 100mg in the morning and afternoon and 200mg at bedtime

## 2023-08-18 ENCOUNTER — THERAPY VISIT (OUTPATIENT)
Dept: PHYSICAL THERAPY | Facility: CLINIC | Age: 68
End: 2023-08-18
Payer: COMMERCIAL

## 2023-08-18 ENCOUNTER — TELEPHONE (OUTPATIENT)
Dept: VASCULAR SURGERY | Facility: CLINIC | Age: 68
End: 2023-08-18

## 2023-08-18 DIAGNOSIS — G89.29 CHRONIC BILATERAL LOW BACK PAIN WITHOUT SCIATICA: ICD-10-CM

## 2023-08-18 DIAGNOSIS — M54.50 CHRONIC BILATERAL LOW BACK PAIN WITHOUT SCIATICA: ICD-10-CM

## 2023-08-18 DIAGNOSIS — M54.6 PAIN IN THORACIC SPINE: Primary | ICD-10-CM

## 2023-08-18 PROCEDURE — 97110 THERAPEUTIC EXERCISES: CPT | Mod: GP

## 2023-08-18 NOTE — TELEPHONE ENCOUNTER
[facility] Call Center    Phone Message    May a detailed message be left on voicemail: yes     Reason for Call: Other: Please send updated ortho order back to [facility] indicating what type of scan is being requested     Action Taken: Message routed to:  Other: joseph baxter    Travel Screening: Not Applicable

## 2023-08-21 ENCOUNTER — TELEPHONE (OUTPATIENT)
Dept: PHYSICAL MEDICINE AND REHAB | Facility: CLINIC | Age: 68
End: 2023-08-21
Payer: COMMERCIAL

## 2023-08-21 NOTE — TELEPHONE ENCOUNTER
Caller: Alexi    Detailed reason for call: patient calling to schedule a consult. He states he spoke with IR and they told him Dr. Laboy doesn't see for that.  Please review and advise whom he should see for a consult    Best phone number to contact: [phone]    Best time to contact: any    Ok to leave a detailed message: Yes    Ok to speak to authorized person if needed: No      (Noted to patient if reason is related to wound or incision, to please send a photo via email or [employer].)

## 2023-08-21 NOTE — TELEPHONE ENCOUNTER
FYI - Status Update    Who is Calling: [facility] calling have not received orders for patient requesting callback   [phone]  Update: te sent to clinic     Does caller want a call/response back: No

## 2023-08-21 NOTE — TELEPHONE ENCOUNTER
Spoke with Dr. Dolan office, please review his notes from 8/15(assessment #5, recommend #3) and in the media file [facility] on 8/8/23. Please advise if Dr. Laboy sees for this issue?

## 2023-08-21 NOTE — TELEPHONE ENCOUNTER
Spoke with Mary in scheduling she is reaching out to Dr. Brumfield team to provide the information we need to assess.

## 2023-08-21 NOTE — TELEPHONE ENCOUNTER
Call placed to  [facility]. She is trying to make appointment for patient. Wondering what the referral is for. Explained patient has been referred to see Dr. Laboy. She asks if AAA is some sort of imaging. Explained that is a medical condition patient is being referred for. I have since faxed the order to Dr. Laboy's number. Stated understanding and appreciation for call back.

## 2023-08-21 NOTE — TELEPHONE ENCOUNTER
Patient calling in again regarding the referral to vascular. Chart reviewed. Patient referred to see Dr. Laboy though [facility]. Information provided. Referral printed and faxed to [phone].

## 2023-08-21 NOTE — TELEPHONE ENCOUNTER
Call from Evette ROSA at vascular center. She is trying to clarify why [facility] is sending patient as the report for the imaging states there is no AAA. She does state Dr. Laboy does not deal with AAAs, but will have another provider review the informatiuon and get back to [facility].

## 2023-08-22 ENCOUNTER — THERAPY VISIT (OUTPATIENT)
Dept: PHYSICAL THERAPY | Facility: CLINIC | Age: 68
End: 2023-08-22
Payer: COMMERCIAL

## 2023-08-22 DIAGNOSIS — G89.29 CHRONIC BILATERAL LOW BACK PAIN WITHOUT SCIATICA: Primary | ICD-10-CM

## 2023-08-22 DIAGNOSIS — M54.6 PAIN IN THORACIC SPINE: ICD-10-CM

## 2023-08-22 DIAGNOSIS — M54.50 CHRONIC BILATERAL LOW BACK PAIN WITHOUT SCIATICA: Primary | ICD-10-CM

## 2023-08-22 PROCEDURE — 97110 THERAPEUTIC EXERCISES: CPT | Mod: GP | Performed by: PHYSICAL THERAPIST

## 2023-08-23 ENCOUNTER — TELEPHONE (OUTPATIENT)
Dept: PHYSICAL MEDICINE AND REHAB | Facility: CLINIC | Age: 68
End: 2023-08-23
Payer: COMMERCIAL

## 2023-08-23 DIAGNOSIS — R20.2 PARESTHESIA: Primary | ICD-10-CM

## 2023-08-23 DIAGNOSIS — M79.662 PAIN OF LEFT LOWER LEG: ICD-10-CM

## 2023-08-23 NOTE — TELEPHONE ENCOUNTER
Phone call to patient to update him on the referral to Dr. Laboy. Explained Ankit Brumfield DO had received information from the vascular specialist who felt the enlarged aorta is only borderline at this time. This would need follow up with a CT abdomen in a year. Instructed patient to reach out to his PCP regarding the follow needed for this. Stated understanding.     He plans to return for the scheduled injection.

## 2023-08-23 NOTE — TELEPHONE ENCOUNTER
Please contact the patient and inform him that I have received a message that I have been in contact with Dr. Laboy in vascular radiology.  He feels that the enlarged aorta is only borderline and would likely need follow-up in a year with a CT scan of the abdomen.  I would recommend the patient follow-up with his primary care provider for this.  He can continue with the plan of the epidural steroid injection to see if it helps with his back pain.

## 2023-08-23 NOTE — TELEPHONE ENCOUNTER
Dr Aguilera reviewed the CT scan and there is no aneurysm. He felt that there was really no need for pt to be seen in consultation. Message sent to Dr Dolan to update he and ensure we are not missing anything.

## 2023-08-29 ENCOUNTER — THERAPY VISIT (OUTPATIENT)
Dept: PHYSICAL THERAPY | Facility: CLINIC | Age: 68
End: 2023-08-29
Payer: COMMERCIAL

## 2023-08-29 DIAGNOSIS — M54.50 CHRONIC BILATERAL LOW BACK PAIN WITHOUT SCIATICA: Primary | ICD-10-CM

## 2023-08-29 DIAGNOSIS — G89.29 CHRONIC BILATERAL LOW BACK PAIN WITHOUT SCIATICA: Primary | ICD-10-CM

## 2023-08-29 DIAGNOSIS — M54.6 PAIN IN THORACIC SPINE: ICD-10-CM

## 2023-08-29 PROCEDURE — 97110 THERAPEUTIC EXERCISES: CPT | Mod: GP | Performed by: PHYSICAL THERAPIST

## 2023-09-05 ENCOUNTER — RADIOLOGY INJECTION OFFICE VISIT (OUTPATIENT)
Dept: PHYSICAL MEDICINE AND REHAB | Facility: CLINIC | Age: 68
End: 2023-09-05
Attending: PHYSICAL MEDICINE & REHABILITATION
Payer: COMMERCIAL

## 2023-09-05 VITALS
SYSTOLIC BLOOD PRESSURE: 120 MMHG | DIASTOLIC BLOOD PRESSURE: 66 MMHG | HEART RATE: 61 BPM | OXYGEN SATURATION: 93 % | RESPIRATION RATE: 16 BRPM | TEMPERATURE: 97.9 F

## 2023-09-05 DIAGNOSIS — M54.14 RADICULAR PAIN OF THORACIC REGION: ICD-10-CM

## 2023-09-05 PROCEDURE — 62321 NJX INTERLAMINAR CRV/THRC: CPT | Performed by: PAIN MEDICINE

## 2023-09-05 RX ORDER — DEXAMETHASONE SODIUM PHOSPHATE 10 MG/ML
INJECTION, SOLUTION INTRAMUSCULAR; INTRAVENOUS
Status: COMPLETED | OUTPATIENT
Start: 2023-09-05 | End: 2023-09-05

## 2023-09-05 RX ORDER — LIDOCAINE HYDROCHLORIDE 10 MG/ML
INJECTION, SOLUTION EPIDURAL; INFILTRATION; INTRACAUDAL; PERINEURAL
Status: COMPLETED | OUTPATIENT
Start: 2023-09-05 | End: 2023-09-05

## 2023-09-05 RX ADMIN — DEXAMETHASONE SODIUM PHOSPHATE 10 MG: 10 INJECTION, SOLUTION INTRAMUSCULAR; INTRAVENOUS at 14:27

## 2023-09-05 RX ADMIN — LIDOCAINE HYDROCHLORIDE 2 ML: 10 INJECTION, SOLUTION EPIDURAL; INFILTRATION; INTRACAUDAL; PERINEURAL at 14:27

## 2023-09-05 ASSESSMENT — PAIN SCALES - GENERAL
PAINLEVEL: MILD PAIN (3)
PAINLEVEL: MILD PAIN (3)

## 2023-09-05 NOTE — PATIENT INSTRUCTIONS
Follow-up visit with Dr. Brumfield in 2-4 weeks to discuss injection outcome and determine care plan going forward.       DISCHARGE INSTRUCTIONS    During office hours (8:00 a.m.- 4:00 p.m.) questions or concerns may be answered  by calling [facility] Nurses at  [phone].  Messages received after hours will be returned the following business day.      In the case of an emergency, please dial 911 or seek assistance at the nearest Emergency Room/Urgent Care facility.     All Patients:    You may experience an increase in your symptoms for the first 2 days (It may take anywhere between 2 days- 2 weeks for the steroid to have maximum effect).    You may use ice on the injection site, as frequently as 20 minutes each hour if needed.    You may take your pain medicine.    You may continue taking your regular medication after your injection. If you have had a Medial Branch Block you may resume pain medication once your pain diary is completed.    You may shower. No swimming, tub bath or hot tub for 48 hours.  You may remove your bandaid/bandage as soon as you are home.    You may resume light activities, as tolerated.    Resume your usual diet as tolerated.    It is strongly advised that you do not drive for 1-3 hours post injection.    If you have had oral sedation:  Do not drive for 8 hours post injection.      If you have had IV sedation:  Do not drive for 24 hours post injection.  Do not operate hazardous machinery or make important personal/business decisions for 24 hours.      POSSIBLE STEROID SIDE EFFECTS (If steroid/cortisone was used for your procedure)    -If you experience these symptoms, it should only last for a short period    Swelling of the legs              Skin redness (flushing)     Mouth (oral) irritation   Blood sugar (glucose) levels            Sweats                    Mood changes  Headache  Sleeplessness  Weakened immune system for up to 14 days, which could increase the risk of  mark the COVID-19 virus and/or experiencing more severe symptoms of the disease, if exposed.  Decreased effectiveness of the flu vaccine if given within 2 weeks of the steroid.         POSSIBLE PROCEDURE SIDE EFFECTS  -Call the Spine Center if you are concerned  Increased Pain           Increased numbness/tingling      Nausea/Vomiting          Bruising/bleeding at site      Redness or swelling                                              Difficulty walking      Weakness           Fever greater than 100.5    *In the event of a severe headache after an epidural steroid injection that is relieved by lying down, please call the [facility] to speak with a clinical staff member*

## 2023-09-12 ENCOUNTER — THERAPY VISIT (OUTPATIENT)
Dept: PHYSICAL THERAPY | Facility: CLINIC | Age: 68
End: 2023-09-12
Payer: COMMERCIAL

## 2023-09-12 DIAGNOSIS — G89.29 CHRONIC BILATERAL LOW BACK PAIN WITHOUT SCIATICA: Primary | ICD-10-CM

## 2023-09-12 DIAGNOSIS — M54.50 CHRONIC BILATERAL LOW BACK PAIN WITHOUT SCIATICA: Primary | ICD-10-CM

## 2023-09-12 DIAGNOSIS — M54.6 PAIN IN THORACIC SPINE: ICD-10-CM

## 2023-09-12 PROCEDURE — 97110 THERAPEUTIC EXERCISES: CPT | Mod: GP | Performed by: PHYSICAL THERAPIST

## 2023-09-19 ENCOUNTER — THERAPY VISIT (OUTPATIENT)
Dept: PHYSICAL THERAPY | Facility: CLINIC | Age: 68
End: 2023-09-19
Payer: COMMERCIAL

## 2023-09-19 DIAGNOSIS — G89.29 CHRONIC BILATERAL LOW BACK PAIN WITHOUT SCIATICA: Primary | ICD-10-CM

## 2023-09-19 DIAGNOSIS — M54.6 PAIN IN THORACIC SPINE: ICD-10-CM

## 2023-09-19 DIAGNOSIS — M54.50 CHRONIC BILATERAL LOW BACK PAIN WITHOUT SCIATICA: Primary | ICD-10-CM

## 2023-09-19 PROCEDURE — 97110 THERAPEUTIC EXERCISES: CPT | Mod: GP | Performed by: PHYSICAL THERAPIST

## 2023-09-19 NOTE — PROGRESS NOTES
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 Brumfield noted AAA on CT scan-patient following with vascular MD   Quick Adds Certification   Progress Note/Certification   Start of Care Date 08/15/23   Onset of illness/injury or Date of Surgery 08/15/23  (MD order)   Therapy Frequency 1x/week   Predicted Duration 8 weeks   Certification date from 08/15/23   Certification date to 10/10/23   GOALS   PT Goals 2   PT Goal 1   Goal Identifier Sleeping   Goal Description Patient will be able to sleep through the night without waking due to pain to improve quality of sleep   Goal Progress goal met   Target Date 10/10/23   Date Met 09/19/23   PT Goal 2   Goal Identifier Prolonged positioning   Goal Description Patient will be able to sit or stand for at least 60 minutes without reproductino of pain to improve ease with ADLs and transportation   Goal Progress goal met   Target Date 10/10/23   Date Met 09/19/23   Subjective Report   Subjective Report The patient reports that he continues to notice improvements in pain. He reports 95% improvement since beginning PT which he attributes to therapy, chiropractic treatments and his injection. He is tolerating ADLs, sleeping, sitting and lifting with much improvement in symptoms. Plan to continue with HEP independently at this time.   Objective Measures   Objective Measures Objective Measure 1;Objective Measure 2;Objective Measure 3   Objective Measure 1   Objective Measure SLS   Details L: 8.93 seconds. R: 6.29 seconds   Objective  "Measure 2   Objective Measure Lumbar AROM:   Details Flexion: reaches floor painfree, Extension: min limited stiffness painfree, sidebending finger tips to mid thigh stiffness   Objective Measure 3   Objective Measure Slump   Details neg slump bilaterally   Treatment Interventions (PT)   Interventions Therapeutic Procedure/Exercise   Therapeutic Procedure/Exercise   Therapeutic Procedures: strength, endurance, ROM, flexibillity minutes (50884) 23   Therapeutic Procedures Ther Proc 2;Ther Proc 3;Ther Proc 4   Ther Proc 2 lumbar roll x10 each side   Ther Proc 3 Nustep level 4 x4 minutes   Ther Proc 3 - Details Rotary torsion 26# to L   Ther Proc 4 iso walk out RTB x10 each direction   PTRx Ther Proc 1 Supine Abdominal Exercise #3 (Marching)   PTRx Ther Proc 1 - Details x10-15   PTRx Ther Proc 2 All 4s Stretch   PTRx Ther Proc 2 - Details review   PTRx Ther Proc 3 Supine Lumbar Hip Roll   PTRx Ther Proc 3 - Details review   PTRx Ther Proc 4 [place] Stretch   PTRx Ther Proc 4 - Details review   PTRx Ther Proc 5 Bridging #1   PTRx Ther Proc 5 - Details review   PTRx Ther Proc 6 All 4s Cat Cow   PTRx Ther Proc 6 - Details review   PTRx Ther Proc 7 Posterior Pelvic Tilt   PTRx Ther Proc 7 - Details review   Neuromuscular Re-education   PTRx Neuro Re-ed 1 Abdominal Brace Transverse Abdominis   PTRx Neuro Re-ed 1 - Details x10 3-5\" holds cueing for breathing    PTRx Neuro Re-ed 2 Shoulder Theraband Rows   PTRx Neuro Re-ed 2 - Details x10 RTB    Education   Learner/Method Patient   Education Comments Eager to participate in therapy   Plan   Home program see PTRX printed   Plan for next session progress core and postural strength, rotary torsion as appropriate for strengthening   Comments   Comments The patient has attended PT consistently for treamtent of his thoracic and low back radicular pain. The patient has demonstrated improvements in ROM, strength and function. The patient has expressed a 95% improvement in " symptoms. Due to achievement of goals, it is appropriate for the patient to be discharged from therapy and continue with HEP independently.   Total Session Time   Timed Code Treatment Minutes 23   Total Treatment Time (sum of timed and untimed services) 23       DISCHARGE  Reason for Discharge: Patient has met all goals.    Discharge Plan: Patient to continue home program.    Referring Provider:  Ankit Burmfield

## 2023-09-26 ENCOUNTER — OFFICE VISIT (OUTPATIENT)
Dept: PHYSICAL MEDICINE AND REHAB | Facility: CLINIC | Age: 68
End: 2023-09-26
Attending: PHYSICAL MEDICINE & REHABILITATION
Payer: COMMERCIAL

## 2023-09-26 VITALS — DIASTOLIC BLOOD PRESSURE: 85 MMHG | HEART RATE: 66 BPM | SYSTOLIC BLOOD PRESSURE: 142 MMHG

## 2023-09-26 DIAGNOSIS — M54.14 RADICULAR PAIN OF THORACIC REGION: Primary | ICD-10-CM

## 2023-09-26 DIAGNOSIS — S32.010S COMPRESSION FRACTURE OF L1 VERTEBRA, SEQUELA: ICD-10-CM

## 2023-09-26 DIAGNOSIS — R10.11 RUQ ABDOMINAL PAIN: ICD-10-CM

## 2023-09-26 DIAGNOSIS — R20.2 PARESTHESIA: ICD-10-CM

## 2023-09-26 PROCEDURE — 99213 OFFICE O/P EST LOW 20 MIN: CPT | Performed by: PHYSICAL MEDICINE & REHABILITATION

## 2023-09-26 ASSESSMENT — PAIN SCALES - GENERAL: PAINLEVEL: NO PAIN (1)

## 2023-09-26 NOTE — LETTER
9/26/2023         RE: Alexi Bruner  [address] [facility] [address]        Dear Colleague,    Thank you for referring your patient, Alexi Bruner, to the [facility]. Please see a copy of my visit note below.    Assessment/Plan:      Alexi was seen today for back pain.    Diagnoses and all orders for this visit:    Radicular pain of thoracic region    RUQ abdominal pain    Compression fracture of L1 vertebra, sequela    Paresthesia    Other orders  -     Vascular Medicine Referral         Assessment: Pleasant 68 year old male with a history of hyperlipidemia and anxiety with:         1. Approximate 6-8 -month history of right flank and thoracic radicular pain in a T10-11 distribution but does cross dermatomes into the abdomen right upper quadrant towards the umbilicus.  He has allodynia/hypersensitivity of the skin throughout the lower rib cage on the right towards the umbilicus.  He has a large anterior osteophyte T11-12 no fracture on thoracic spine.  Mild foraminal stenosis T10-11 with a chronic L1 compression fracture on CT scan.  Some left L1 radicular symptoms.  80 to 90% improved following T12-L1 interlaminar CARTER and continues to do home exercises.    2.  Myofascial pain and right upper quadrant pain.  Improved with lumbar epidural      3.  Intermittent paresthesias of the feet unknown ideology.     4.  Chronic L1 compression fracture likely incidental.  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     5.  On my review of the recent CT thoracic spine and CT abdomen there is some aortic ectopy in the mid thoracic spine versus abdominal aortic aneurysm. Recommend monitoring with PCP.       Discussion:    1.  overall he is doing fairly well.  Lumbar epidural provided 80 to 90% relief.  He continues to do home exercise and we discussed home exercises, medication changes, further injections.    2.  Continue with home exercises from  physical therapy.    3.  Wean gabapentin as able.  Currently taking 100 mg twice a day we will decrease this to 100 mg at bedtime and subsequently discontinue this if it is not helpful.    4.  He will continue to monitor the abdominal aortic aneurysm.  I been in touch with Dr. Laboy who felt that this was only ectopy and would recommend he follow this with his primary care provider.    5.  Follow-up with me as needed.      It was our pleasure caring for your patient today, if there any questions or concerns please do not hesitate to contact us.      Subjective:   Patient ID: Alexi Bruner is a 68 year old male.    History of Present Illness: Patient follows up after T12-L1 interlaminar CARTER.  He is 80 to 90% improved.  He is able to continue to do his work in the yard and around the house.  He has been able to decrease gabapentin from 400 mg a day down to 100 mg twice a day.  At the 400 mg dosage he did have some dizziness when he came to physical therapy and he decreased to 100 mg twice a day and he is doing much better with regards to dizziness unsure if it is helping his symptoms.  He still has numbness and tingling along the right abdomen and left lower quadrant anteriorly but otherwise the pain is markedly improved following the lumbar epidural.  80 to 90% improved.  Not sure what makes his pain worse can be random better with lying down.  Pain is a 2/10 at worst 1/10 today 0/10 at best.    I have been in touch with Dr Laboy who recommends monitorig the AAA.       Imaging: Personally reviewed CT scan imagesOf the thoracic spine again today which revealed the L1 compression fracture 30% with ankylosis T12-L1 and some mild T9-10 and T10-11 neuroforaminal stenosis.    Review of Systems: Pertinent positives: Paresthesias.  Pertinent negatives: No  weakness.  No bowel or bladder incontinence.  No urinary retention.  No fevers, unintentional weight loss, balance changes, headaches, frequent falling, difficulty  swallowing, or coordination difficulties.  All others reviewed are negative.         Past Medical History:   Diagnosis Date     Anxiety      Hyperlipidemia        The following portions of the patient's history were reviewed and updated as appropriate: allergies, current medications, past family history, past medical history, past social history, past surgical history and problem list.           Objective:   Physical Exam:    BP (!) 142/85   Pulse 66   There is no height or weight on file to calculate BMI.      General: Alert and oriented with normal affect. Attention, knowledge, memory, and language are intact. No acute distress.   Eyes: Sclerae are clear.  Respirations: Unlabored. CV: No lower extremity edema.  Skin: No rashes seen.    Gait:  Nonantalgic    Sensation is intact to light touch throughout the  lower extremities.       Manual muscle testing reveals:  Right /Left out of 5     5/5 knee flexors  5/5 knee extensors  5/5 ankle plantar flexors  5/5 ankle dorsiflexors  5/5   ankle evertors      Again, thank you for allowing me to participate in the care of your patient.        Sincerely,        Ankit Brumfield, DO

## 2023-09-26 NOTE — PROGRESS NOTES
Assessment/Plan:      Alexi was seen today for back pain.    Diagnoses and all orders for this visit:    Radicular pain of thoracic region    RUQ abdominal pain    Compression fracture of L1 vertebra, sequela    Paresthesia    Other orders  -     Vascular Medicine Referral         Assessment: Pleasant 68 year old male with a history of hyperlipidemia and anxiety with:         1. Approximate 6-8 -month history of right flank and thoracic radicular pain in a T10-11 distribution but does cross dermatomes into the abdomen right upper quadrant towards the umbilicus.  He has allodynia/hypersensitivity of the skin throughout the lower rib cage on the right towards the umbilicus.  He has a large anterior osteophyte T11-12 no fracture on thoracic spine.  Mild foraminal stenosis T10-11 with a chronic L1 compression fracture on CT scan.  Some left L1 radicular symptoms.  80 to 90% improved following T12-L1 interlaminar CARTER and continues to do home exercises.    2.  Myofascial pain and right upper quadrant pain.  Improved with lumbar epidural      3.  Intermittent paresthesias of the feet unknown ideology.     4.  Chronic L1 compression fracture likely incidental.  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     5.  On my review of the recent CT thoracic spine and CT abdomen there is some aortic ectopy in the mid thoracic spine versus abdominal aortic aneurysm. Recommend monitoring with PCP.       Discussion:    1.  overall he is doing fairly well.  Lumbar epidural provided 80 to 90% relief.  He continues to do home exercise and we discussed home exercises, medication changes, further injections.    2.  Continue with home exercises from physical therapy.    3.  Wean gabapentin as able.  Currently taking 100 mg twice a day we will decrease this to 100 mg at bedtime and subsequently discontinue this if it is not helpful.    4.  He will continue to monitor the abdominal aortic aneurysm.   I been in touch with Dr. Laboy who felt that this was only ectopy and would recommend he follow this with his primary care provider.    5.  Follow-up with me as needed.      It was our pleasure caring for your patient today, if there any questions or concerns please do not hesitate to contact us.      Subjective:   Patient ID: Alexi Bruner is a 68 year old male.    History of Present Illness: Patient follows up after T12-L1 interlaminar CARTER.  He is 80 to 90% improved.  He is able to continue to do his work in the yard and around the house.  He has been able to decrease gabapentin from 400 mg a day down to 100 mg twice a day.  At the 400 mg dosage he did have some dizziness when he came to physical therapy and he decreased to 100 mg twice a day and he is doing much better with regards to dizziness unsure if it is helping his symptoms.  He still has numbness and tingling along the right abdomen and left lower quadrant anteriorly but otherwise the pain is markedly improved following the lumbar epidural.  80 to 90% improved.  Not sure what makes his pain worse can be random better with lying down.  Pain is a 2/10 at worst 1/10 today 0/10 at best.    I have been in touch with Dr Laboy who recommends monitorig the AAA.       Imaging: Personally reviewed CT scan imagesOf the thoracic spine again today which revealed the L1 compression fracture 30% with ankylosis T12-L1 and some mild T9-10 and T10-11 neuroforaminal stenosis.    Review of Systems: Pertinent positives: Paresthesias.  Pertinent negatives: No  weakness.  No bowel or bladder incontinence.  No urinary retention.  No fevers, unintentional weight loss, balance changes, headaches, frequent falling, difficulty swallowing, or coordination difficulties.  All others reviewed are negative.         Past Medical History:   Diagnosis Date    Anxiety     Hyperlipidemia        The following portions of the patient's history were reviewed and updated as appropriate:  allergies, current medications, past family history, past medical history, past social history, past surgical history and problem list.           Objective:   Physical Exam:    BP (!) 142/85   Pulse 66   There is no height or weight on file to calculate BMI.      General: Alert and oriented with normal affect. Attention, knowledge, memory, and language are intact. No acute distress.   Eyes: Sclerae are clear.  Respirations: Unlabored. CV: No lower extremity edema.  Skin: No rashes seen.    Gait:  Nonantalgic    Sensation is intact to light touch throughout the  lower extremities.       Manual muscle testing reveals:  Right /Left out of 5     5/5 knee flexors  5/5 knee extensors  5/5 ankle plantar flexors  5/5 ankle dorsiflexors  5/5   ankle evertors

## 2023-11-28 ENCOUNTER — OFFICE VISIT (OUTPATIENT)
Dept: PHYSICAL MEDICINE AND REHAB | Facility: CLINIC | Age: 68
End: 2023-11-28
Payer: COMMERCIAL

## 2023-11-28 VITALS — HEART RATE: 83 BPM | WEIGHT: 220 LBS | SYSTOLIC BLOOD PRESSURE: 124 MMHG | DIASTOLIC BLOOD PRESSURE: 75 MMHG

## 2023-11-28 DIAGNOSIS — S32.010S COMPRESSION FRACTURE OF L1 VERTEBRA, SEQUELA: ICD-10-CM

## 2023-11-28 DIAGNOSIS — M51.34 DEGENERATION OF INTERVERTEBRAL DISC OF THORACIC REGION WITH OSTEOPHYTE: ICD-10-CM

## 2023-11-28 DIAGNOSIS — M25.78 DEGENERATION OF INTERVERTEBRAL DISC OF THORACIC REGION WITH OSTEOPHYTE: ICD-10-CM

## 2023-11-28 DIAGNOSIS — M54.14 RADICULAR PAIN OF THORACIC REGION: Primary | ICD-10-CM

## 2023-11-28 PROCEDURE — 99213 OFFICE O/P EST LOW 20 MIN: CPT | Performed by: PHYSICAL MEDICINE & REHABILITATION

## 2023-11-28 ASSESSMENT — PAIN SCALES - GENERAL: PAINLEVEL: MILD PAIN (2)

## 2023-11-28 NOTE — LETTER
11/28/2023         RE: Alexi Bruner  [address] [facility] [address]        Dear Colleague,    Thank you for referring your patient, Alexi Bruner, to the [facility]UROSURGERY. Please see a copy of my visit note below.    Assessment/Plan:      Alexi was seen today for back pain.    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  Approximate 10-month month history of right flank and thoracic radicular pain in a T10-11 distribution but does cross dermatomes into the abdomen right upper quadrant towards the umbilicus.  He has allodynia/hypersensitivity of the skin throughout the lower rib cage on the right towards the umbilicus.  He has a large anterior osteophyte T11-12 no fracture on thoracic spine.  Mild foraminal stenosis T10-11 with a chronic L1 compression fracture on CT scan.  Some left L1 radicular symptoms.  Continues to be 80 to 85% improved following T12-L1 interlaminar CARTER .  Still has some numbness over the right T 9-10 dermatome.     2.  Myofascial pain and right upper quadrant pain.  Improved with lumbar epidural      3.  Intermittent paresthesias of the feet unknown ideology.     4.  Chronic L1 compression fracture likely incidental.  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     5.  On my review of the recent CT thoracic spine and CT abdomen there is some aortic ectopy in the mid thoracic spine versus abdominal aortic aneurysm. Recommend continue to monitor with PCP.          Discussion:    1.  Overall continues to do very well following   epidural taking gabapentin 100 mg twice a day and doing home exercises.  We discussed options of changing medications to Lyrica along  with continue exercises or further interventions.  At this time he continues to do well I would like to monitor symptoms.      2. Continue gabapentin 100 mg twice a day.  Can consider Lyrica change in the future.    3.  Continue home exercises from PT.    4.  Can repeat T12-L1 interlaminar epidural steroid injection in the future if needed.  Is 80 to 85% improved for over 2 months following last injection at this point.    It was our pleasure caring for your patient today, if there any questions or concerns please do not hesitate to contact us.      Subjective:   Patient ID: Alexi Bruner is a 68 year old male.    History of Present Illness: Patient presents for follow-up of right thoracolumbar radicular pain.  This is along the lower thoracic dermatomes to the right abdomen around the umbilicus slightly higher but is more in a vertical fashion.  Continues to be 80 to 85% improved after YINA 12-L1 interlaminar epidural steroid injection.  Taking gabapentin 100 mg twice daily.  More gabapentin than that he gets very dizzy.  His numbness is fairly constant but still has some intermittent increased pain worse with sitting potentially in certain positions better with laying on his right side and with gabapentin.  Pain is a 2/10 today and at worst 0/10 at best.  Overall tolerating this very well doing his home exercises from PT.  Pleased with his progress thus far.      Imaging: I reviewed CT scan thoracic spine revealing chronic L1 compression fracture 30% height loss with ankylosis T12-L1 across the endplates and facets.  Mild T9-10 and T10-11 foraminal narrowing/stenosis bilaterally.  There is also ankylosis T11-12 on my review.  Large anterior osteophyte.    MRI thoracic spine from [facility] reveals mild facet arthropathy bilaterally T9-10 through T11-12.  No spinal stenosis.  Chronic anterior wedge fracture at L1.    Review of Systems: Pertinent positives:   Numbness right abdomen.  Pertinent negatives: No   weakness.  No bowel or bladder incontinence.  No urinary retention.  No fevers, unintentional weight loss, balance changes, headaches, frequent falling, difficulty swallowing, or coordination difficulties.  All others reviewed are negative.           Past Medical History:   Diagnosis Date     Anxiety      Hyperlipidemia        The following portions of the patient's history were reviewed and updated as appropriate: allergies, current medications, past family history, past medical history, past social history, past surgical history and problem list.           Objective:   Physical Exam:    /75   Pulse 83   Wt 220 lb (99.8 kg)   There is no height or weight on file to calculate BMI.      General: Alert and oriented with normal affect. Attention, knowledge, memory, and language are intact. No acute distress.   Eyes: Sclerae are clear.  Respirations: Unlabored. CV: No lower extremity edema.    Gait:  Nonantalgic    Sensation is decreased to light touch right abdomen T8-10 dermatome intact in the lower extremities       Manual muscle testing reveals:  Right /Left out of 5     5/5 hip flexors  5/5 knee flexors  5/5 knee extensors  5/5 ankle plantar flexors  5/5 ankle dorsiflexors  5/5  ankle evertors      Again, thank you for allowing me to participate in the care of your patient.        Sincerely,        Ankit Brumfield, DO

## 2023-11-28 NOTE — PROGRESS NOTES
Assessment/Plan:      Alexi was seen today for back pain.    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  Approximate 10-month month history of right flank and thoracic radicular pain in a T10-11 distribution but does cross dermatomes into the abdomen right upper quadrant towards the umbilicus.  He has allodynia/hypersensitivity of the skin throughout the lower rib cage on the right towards the umbilicus.  He has a large anterior osteophyte T11-12 no fracture on thoracic spine.  Mild foraminal stenosis T10-11 with a chronic L1 compression fracture on CT scan.  Some left L1 radicular symptoms.  Continues to be 80 to 85% improved following T12-L1 interlaminar CARTER .  Still has some numbness over the right T 9-10 dermatome.     2.  Myofascial pain and right upper quadrant pain.  Improved with lumbar epidural      3.  Intermittent paresthesias of the feet unknown ideology.     4.  Chronic L1 compression fracture likely incidental.  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     5.  On my review of the recent CT thoracic spine and CT abdomen there is some aortic ectopy in the mid thoracic spine versus abdominal aortic aneurysm. Recommend continue to monitor with PCP.          Discussion:    1.  Overall continues to do very well following   epidural taking gabapentin 100 mg twice a day and doing home exercises.  We discussed options of changing medications to Lyrica along with continue exercises or further interventions.  At this time he continues to do well I would like to monitor symptoms.      2. Continue gabapentin 100 mg twice a day.  Can consider Lyrica change in the future.    3.  Continue home exercises from  PT.    4.  Can repeat T12-L1 interlaminar epidural steroid injection in the future if needed.  Is 80 to 85% improved for over 2 months following last injection at this point.    It was our pleasure caring for your patient today, if there any questions or concerns please do not hesitate to contact us.      Subjective:   Patient ID: Alexi Bruner is a 68 year old male.    History of Present Illness: Patient presents for follow-up of right thoracolumbar radicular pain.  This is along the lower thoracic dermatomes to the right abdomen around the umbilicus slightly higher but is more in a vertical fashion.  Continues to be 80 to 85% improved after YINA 12-L1 interlaminar epidural steroid injection.  Taking gabapentin 100 mg twice daily.  More gabapentin than that he gets very dizzy.  His numbness is fairly constant but still has some intermittent increased pain worse with sitting potentially in certain positions better with laying on his right side and with gabapentin.  Pain is a 2/10 today and at worst 0/10 at best.  Overall tolerating this very well doing his home exercises from PT.  Pleased with his progress thus far.      Imaging: I reviewed CT scan thoracic spine revealing chronic L1 compression fracture 30% height loss with ankylosis T12-L1 across the endplates and facets.  Mild T9-10 and T10-11 foraminal narrowing/stenosis bilaterally.  There is also ankylosis T11-12 on my review.  Large anterior osteophyte.    MRI thoracic spine from [facility] reveals mild facet arthropathy bilaterally T9-10 through T11-12.  No spinal stenosis.  Chronic anterior wedge fracture at L1.    Review of Systems: Pertinent positives:   Numbness right abdomen.  Pertinent negatives: No  weakness.  No bowel or bladder incontinence.  No urinary retention.  No fevers, unintentional weight loss, balance changes, headaches, frequent falling, difficulty swallowing, or coordination difficulties.  All others reviewed are negative.            Past Medical History:   Diagnosis Date    Anxiety     Hyperlipidemia        The following portions of the patient's history were reviewed and updated as appropriate: allergies, current medications, past family history, past medical history, past social history, past surgical history and problem list.           Objective:   Physical Exam:    /75   Pulse 83   Wt 220 lb (99.8 kg)   There is no height or weight on file to calculate BMI.      General: Alert and oriented with normal affect. Attention, knowledge, memory, and language are intact. No acute distress.   Eyes: Sclerae are clear.  Respirations: Unlabored. CV: No lower extremity edema.    Gait:  Nonantalgic    Sensation is decreased to light touch right abdomen T8-10 dermatome intact in the lower extremities       Manual muscle testing reveals:  Right /Left out of 5     5/5 hip flexors  5/5 knee flexors  5/5 knee extensors  5/5 ankle plantar flexors  5/5 ankle dorsiflexors  5/5  ankle evertors

## 2023-11-28 NOTE — PATIENT INSTRUCTIONS
1. Continue with gabapentin  2. Continue with home physical therapy exercises  3. Please call our nurses if you have any questions: Clinic Phone # [phone]

## 2023-12-04 NOTE — PROGRESS NOTES
DISCHARGE  Reason for Discharge: Patient chooses to discontinue therapy.    Equipment Issued: HEP    Discharge Plan: Patient to continue home program.    Referring Provider:  Ankit Brumfield

## 2024-02-26 ENCOUNTER — TELEPHONE (OUTPATIENT)
Dept: PHYSICAL MEDICINE AND REHAB | Facility: CLINIC | Age: 69
End: 2024-02-26
Payer: COMMERCIAL

## 2024-02-26 DIAGNOSIS — M54.14 RADICULAR PAIN OF THORACIC REGION: Primary | ICD-10-CM

## 2024-02-26 NOTE — TELEPHONE ENCOUNTER
Pt left voicemail on nurse navigation line inquiring about scheduling for repeat injection.   Chart reviewed and pt called to discuss further. Pt had T12-L1 ILESI on 9/5/23. Pt reports the injection gave him 95% relief of his symptoms until 1 week ago when his same symptoms returned.   Order placed for repeat injection. Injection requirements reviewed. Transferred to scheduling to make appt.

## 2024-03-07 ENCOUNTER — RADIOLOGY INJECTION OFFICE VISIT (OUTPATIENT)
Dept: PHYSICAL MEDICINE AND REHAB | Facility: CLINIC | Age: 69
End: 2024-03-07
Attending: PHYSICAL MEDICINE & REHABILITATION
Payer: COMMERCIAL

## 2024-03-07 VITALS
OXYGEN SATURATION: 92 % | TEMPERATURE: 97.9 F | RESPIRATION RATE: 16 BRPM | SYSTOLIC BLOOD PRESSURE: 142 MMHG | DIASTOLIC BLOOD PRESSURE: 74 MMHG | HEART RATE: 75 BPM

## 2024-03-07 DIAGNOSIS — M54.14 RADICULAR PAIN OF THORACIC REGION: ICD-10-CM

## 2024-03-07 PROCEDURE — 62321 NJX INTERLAMINAR CRV/THRC: CPT | Performed by: PAIN MEDICINE

## 2024-03-07 RX ORDER — DEXAMETHASONE SODIUM PHOSPHATE 10 MG/ML
INJECTION, SOLUTION INTRAMUSCULAR; INTRAVENOUS
Status: COMPLETED | OUTPATIENT
Start: 2024-03-07 | End: 2024-03-07

## 2024-03-07 RX ORDER — LIDOCAINE HYDROCHLORIDE 10 MG/ML
INJECTION, SOLUTION EPIDURAL; INFILTRATION; INTRACAUDAL; PERINEURAL
Status: COMPLETED | OUTPATIENT
Start: 2024-03-07 | End: 2024-03-07

## 2024-03-07 RX ADMIN — DEXAMETHASONE SODIUM PHOSPHATE 10 MG: 10 INJECTION, SOLUTION INTRAMUSCULAR; INTRAVENOUS at 12:56

## 2024-03-07 RX ADMIN — LIDOCAINE HYDROCHLORIDE 2 ML: 10 INJECTION, SOLUTION EPIDURAL; INFILTRATION; INTRACAUDAL; PERINEURAL at 12:56

## 2024-03-07 ASSESSMENT — PAIN SCALES - GENERAL
PAINLEVEL: SEVERE PAIN (6)
PAINLEVEL: NO PAIN (0)

## 2024-03-07 NOTE — PATIENT INSTRUCTIONS
DISCHARGE INSTRUCTIONS    During office hours (8:00 a.m.- 4:00 p.m.) questions or concerns may be answered  by calling [facility] Navigation Nurses at  [phone].  Messages received after hours will be returned the following business day.      In the case of an emergency, please dial 911 or seek assistance at the nearest Emergency Room/Urgent Care facility.     All Patients:    You may experience an increase in your symptoms for the first 2 days (It may take anywhere between 2 days- 2 weeks for the steroid to have maximum effect).    You may use ice on the injection site, as frequently as 20 minutes each hour if needed.    You may take your pain medicine.    You may continue taking your regular medication after your injection. If you have had a Medial Branch Block you may resume pain medication once your pain diary is completed.    You may shower. No swimming, tub bath or hot tub for 48 hours.  You may remove your bandaid/bandage as soon as you are home.    You may resume light activities, as tolerated.    Resume your usual diet as tolerated.    It is strongly advised that you do not drive for 1-3 hours post injection.    If you have had oral sedation:  Do not drive for 8 hours post injection.      If you have had IV sedation:  Do not drive for 24 hours post injection.  Do not operate hazardous machinery or make important personal/business decisions for 24 hours.      POSSIBLE STEROID SIDE EFFECTS (If steroid/cortisone was used for your procedure)    -If you experience these symptoms, it should only last for a short period    Swelling of the legs              Skin redness (flushing)     Mouth (oral) irritation   Blood sugar (glucose) levels            Sweats                    Mood changes  Headache  Sleeplessness  Weakened immune system for up to 14 days, which could increase the risk of mark the COVID-19 virus and/or experiencing more severe symptoms of the disease, if exposed.  Decreased  effectiveness of the flu vaccine if given within 2 weeks of the steroid.         POSSIBLE PROCEDURE SIDE EFFECTS  -Call the Spine Center if you are concerned  Increased Pain           Increased numbness/tingling      Nausea/Vomiting          Bruising/bleeding at site      Redness or swelling                                              Difficulty walking      Weakness           Fever greater than 100.5    *In the event of a severe headache after an epidural steroid injection that is relieved by lying down, please call the [facility] to speak with a clinical staff member*

## 2024-04-22 DIAGNOSIS — M54.14 RADICULAR PAIN OF THORACIC REGION: ICD-10-CM

## 2024-04-22 DIAGNOSIS — M79.18 MYOFASCIAL PAIN: ICD-10-CM

## 2024-04-22 RX ORDER — GABAPENTIN 100 MG/1
CAPSULE ORAL
Qty: 120 CAPSULE | Refills: 1 | Status: SHIPPED | OUTPATIENT
Start: 2024-04-22

## 2024-06-10 ENCOUNTER — LAB REQUISITION (OUTPATIENT)
Dept: LAB | Facility: CLINIC | Age: 69
End: 2024-06-10
Payer: COMMERCIAL

## 2024-06-10 DIAGNOSIS — R10.9 UNSPECIFIED ABDOMINAL PAIN: ICD-10-CM

## 2024-06-10 DIAGNOSIS — Z13.228 ENCOUNTER FOR SCREENING FOR OTHER METABOLIC DISORDERS: ICD-10-CM

## 2024-06-10 DIAGNOSIS — E78.2 MIXED HYPERLIPIDEMIA: ICD-10-CM

## 2024-06-10 LAB
ALBUMIN SERPL BCG-MCNC: 4.4 G/DL (ref 3.5–5.2)
ALP SERPL-CCNC: 128 U/L (ref 40–150)
ALT SERPL W P-5'-P-CCNC: 28 U/L (ref 0–70)
ANION GAP SERPL CALCULATED.3IONS-SCNC: 12 MMOL/L (ref 7–15)
AST SERPL W P-5'-P-CCNC: 22 U/L (ref 0–45)
BILIRUB SERPL-MCNC: 0.7 MG/DL
BUN SERPL-MCNC: 13 MG/DL (ref 8–23)
CALCIUM SERPL-MCNC: 9.7 MG/DL (ref 8.8–10.2)
CHLORIDE SERPL-SCNC: 105 MMOL/L (ref 98–107)
CHOLEST SERPL-MCNC: 152 MG/DL
CREAT SERPL-MCNC: 0.91 MG/DL (ref 0.67–1.17)
DEPRECATED HCO3 PLAS-SCNC: 22 MMOL/L (ref 22–29)
EGFRCR SERPLBLD CKD-EPI 2021: >90 ML/MIN/1.73M2
FASTING STATUS PATIENT QL REPORTED: ABNORMAL
FASTING STATUS PATIENT QL REPORTED: NORMAL
GLUCOSE SERPL-MCNC: 102 MG/DL (ref 70–99)
HDLC SERPL-MCNC: 42 MG/DL
LDLC SERPL CALC-MCNC: 93 MG/DL
LIPASE SERPL-CCNC: 25 U/L (ref 13–60)
NONHDLC SERPL-MCNC: 110 MG/DL
POTASSIUM SERPL-SCNC: 4.7 MMOL/L (ref 3.4–5.3)
PROT SERPL-MCNC: 7.7 G/DL (ref 6.4–8.3)
SODIUM SERPL-SCNC: 139 MMOL/L (ref 135–145)
TRIGL SERPL-MCNC: 87 MG/DL

## 2024-06-10 PROCEDURE — 80061 LIPID PANEL: CPT | Mod: ORL | Performed by: FAMILY MEDICINE

## 2024-06-10 PROCEDURE — 83690 ASSAY OF LIPASE: CPT | Mod: ORL | Performed by: FAMILY MEDICINE

## 2024-06-10 PROCEDURE — 80053 COMPREHEN METABOLIC PANEL: CPT | Mod: ORL | Performed by: FAMILY MEDICINE

## 2024-06-11 ENCOUNTER — LAB REQUISITION (OUTPATIENT)
Dept: LAB | Facility: CLINIC | Age: 69
End: 2024-06-11
Payer: COMMERCIAL

## 2024-06-11 DIAGNOSIS — Z86.19 PERSONAL HISTORY OF OTHER INFECTIOUS AND PARASITIC DISEASES: ICD-10-CM

## 2024-06-11 PROCEDURE — 87338 HPYLORI STOOL AG IA: CPT | Mod: ORL | Performed by: FAMILY MEDICINE

## 2024-06-12 LAB — H PYLORI AG STL QL IA: NEGATIVE

## 2024-06-21 ENCOUNTER — HOSPITAL ENCOUNTER (OUTPATIENT)
Facility: HOSPITAL | Age: 69
Setting detail: OBSERVATION
Discharge: HOME | End: 2024-06-22
Attending: EMERGENCY MEDICINE | Admitting: INTERNAL MEDICINE
Payer: COMMERCIAL

## 2024-06-21 ENCOUNTER — APPOINTMENT (OUTPATIENT)
Dept: CT IMAGING | Facility: HOSPITAL | Age: 69
End: 2024-06-21
Attending: EMERGENCY MEDICINE
Payer: COMMERCIAL

## 2024-06-21 DIAGNOSIS — K92.1 MELENA: Primary | ICD-10-CM

## 2024-06-21 DIAGNOSIS — Z87.19 HISTORY OF MELENA: ICD-10-CM

## 2024-06-21 DIAGNOSIS — R10.13 EPIGASTRIC PAIN: ICD-10-CM

## 2024-06-21 DIAGNOSIS — R00.1 BRADYCARDIA: ICD-10-CM

## 2024-06-21 PROBLEM — J44.9 COPD (CHRONIC OBSTRUCTIVE PULMONARY DISEASE) (H): Status: ACTIVE | Noted: 2024-06-21

## 2024-06-21 PROBLEM — S32.010D COMPRESSION FRACTURE OF L1 VERTEBRA WITH ROUTINE HEALING: Status: ACTIVE | Noted: 2022-06-01

## 2024-06-21 PROBLEM — I71.40 AAA (ABDOMINAL AORTIC ANEURYSM) (H): Status: ACTIVE | Noted: 2023-06-01

## 2024-06-21 PROBLEM — G89.29 CHRONIC BILATERAL LOW BACK PAIN WITHOUT SCIATICA: Status: ACTIVE | Noted: 2023-08-15

## 2024-06-21 PROBLEM — F10.21 ALCOHOL DEPENDENCE IN REMISSION (H): Status: ACTIVE | Noted: 2024-06-21

## 2024-06-21 PROBLEM — M54.50 CHRONIC BILATERAL LOW BACK PAIN WITHOUT SCIATICA: Status: ACTIVE | Noted: 2023-08-15

## 2024-06-21 LAB
ABO/RH(D): NORMAL
ALBUMIN SERPL BCG-MCNC: 4.3 G/DL (ref 3.5–5.2)
ALP SERPL-CCNC: 138 U/L (ref 40–150)
ALT SERPL W P-5'-P-CCNC: 35 U/L (ref 0–70)
ANION GAP SERPL CALCULATED.3IONS-SCNC: 12 MMOL/L (ref 7–15)
ANTIBODY SCREEN: NEGATIVE
AST SERPL W P-5'-P-CCNC: 26 U/L (ref 0–45)
BASOPHILS # BLD AUTO: 0.1 10E3/UL (ref 0–0.2)
BASOPHILS NFR BLD AUTO: 1 %
BILIRUB SERPL-MCNC: 0.7 MG/DL
BUN SERPL-MCNC: 11.9 MG/DL (ref 8–23)
CALCIUM SERPL-MCNC: 9.4 MG/DL (ref 8.8–10.2)
CHLORIDE SERPL-SCNC: 106 MMOL/L (ref 98–107)
CREAT SERPL-MCNC: 0.88 MG/DL (ref 0.67–1.17)
DEPRECATED HCO3 PLAS-SCNC: 23 MMOL/L (ref 22–29)
EGFRCR SERPLBLD CKD-EPI 2021: >90 ML/MIN/1.73M2
EOSINOPHIL # BLD AUTO: 0.3 10E3/UL (ref 0–0.7)
EOSINOPHIL NFR BLD AUTO: 5 %
ERYTHROCYTE [DISTWIDTH] IN BLOOD BY AUTOMATED COUNT: 13.2 % (ref 10–15)
GLUCOSE SERPL-MCNC: 95 MG/DL (ref 70–99)
HCT VFR BLD AUTO: 49.7 % (ref 40–53)
HGB BLD-MCNC: 16.6 G/DL (ref 13.3–17.7)
IMM GRANULOCYTES # BLD: 0 10E3/UL
IMM GRANULOCYTES NFR BLD: 1 %
INR PPP: 1 (ref 0.85–1.15)
LYMPHOCYTES # BLD AUTO: 2.6 10E3/UL (ref 0.8–5.3)
LYMPHOCYTES NFR BLD AUTO: 36 %
MCH RBC QN AUTO: 30.6 PG (ref 26.5–33)
MCHC RBC AUTO-ENTMCNC: 33.4 G/DL (ref 31.5–36.5)
MCV RBC AUTO: 92 FL (ref 78–100)
MONOCYTES # BLD AUTO: 0.5 10E3/UL (ref 0–1.3)
MONOCYTES NFR BLD AUTO: 7 %
NEUTROPHILS # BLD AUTO: 3.6 10E3/UL (ref 1.6–8.3)
NEUTROPHILS NFR BLD AUTO: 51 %
NRBC # BLD AUTO: 0 10E3/UL
NRBC BLD AUTO-RTO: 0 /100
PLATELET # BLD AUTO: 237 10E3/UL (ref 150–450)
POTASSIUM SERPL-SCNC: 4.5 MMOL/L (ref 3.4–5.3)
PROT SERPL-MCNC: 7.9 G/DL (ref 6.4–8.3)
RBC # BLD AUTO: 5.43 10E6/UL (ref 4.4–5.9)
SODIUM SERPL-SCNC: 141 MMOL/L (ref 135–145)
SPECIMEN EXPIRATION DATE: NORMAL
TROPONIN T SERPL HS-MCNC: 20 NG/L
TSH SERPL DL<=0.005 MIU/L-ACNC: 1.39 UIU/ML (ref 0.3–4.2)
WBC # BLD AUTO: 7.2 10E3/UL (ref 4–11)

## 2024-06-21 PROCEDURE — 258N000003 HC RX IP 258 OP 636: Mod: JZ | Performed by: INTERNAL MEDICINE

## 2024-06-21 PROCEDURE — 84443 ASSAY THYROID STIM HORMONE: CPT | Performed by: EMERGENCY MEDICINE

## 2024-06-21 PROCEDURE — 85610 PROTHROMBIN TIME: CPT | Performed by: EMERGENCY MEDICINE

## 2024-06-21 PROCEDURE — 86900 BLOOD TYPING SEROLOGIC ABO: CPT | Performed by: EMERGENCY MEDICINE

## 2024-06-21 PROCEDURE — 84484 ASSAY OF TROPONIN QUANT: CPT | Performed by: EMERGENCY MEDICINE

## 2024-06-21 PROCEDURE — C9113 INJ PANTOPRAZOLE SODIUM, VIA: HCPCS | Mod: JZ | Performed by: EMERGENCY MEDICINE

## 2024-06-21 PROCEDURE — 96375 TX/PRO/DX INJ NEW DRUG ADDON: CPT

## 2024-06-21 PROCEDURE — 74174 CTA ABD&PLVS W/CONTRAST: CPT

## 2024-06-21 PROCEDURE — 80053 COMPREHEN METABOLIC PANEL: CPT | Performed by: EMERGENCY MEDICINE

## 2024-06-21 PROCEDURE — 99285 EMERGENCY DEPT VISIT HI MDM: CPT | Mod: 25

## 2024-06-21 PROCEDURE — G0378 HOSPITAL OBSERVATION PER HR: HCPCS

## 2024-06-21 PROCEDURE — 250N000011 HC RX IP 250 OP 636: Performed by: EMERGENCY MEDICINE

## 2024-06-21 PROCEDURE — 250N000011 HC RX IP 250 OP 636: Mod: JZ | Performed by: EMERGENCY MEDICINE

## 2024-06-21 PROCEDURE — 36415 COLL VENOUS BLD VENIPUNCTURE: CPT | Performed by: EMERGENCY MEDICINE

## 2024-06-21 PROCEDURE — 99223 1ST HOSP IP/OBS HIGH 75: CPT | Performed by: INTERNAL MEDICINE

## 2024-06-21 PROCEDURE — 93005 ELECTROCARDIOGRAM TRACING: CPT | Performed by: EMERGENCY MEDICINE

## 2024-06-21 PROCEDURE — 85025 COMPLETE CBC W/AUTO DIFF WBC: CPT | Performed by: EMERGENCY MEDICINE

## 2024-06-21 PROCEDURE — 250N000013 HC RX MED GY IP 250 OP 250 PS 637: Performed by: INTERNAL MEDICINE

## 2024-06-21 PROCEDURE — 96374 THER/PROPH/DIAG INJ IV PUSH: CPT | Mod: 59

## 2024-06-21 RX ORDER — POLYETHYLENE GLYCOL 3350 17 G/17G
1 POWDER, FOR SOLUTION ORAL AT BEDTIME
COMMUNITY

## 2024-06-21 RX ORDER — GABAPENTIN 100 MG/1
100 CAPSULE ORAL 2 TIMES DAILY
Status: DISCONTINUED | OUTPATIENT
Start: 2024-06-22 | End: 2024-06-22 | Stop reason: HOSPADM

## 2024-06-21 RX ORDER — SODIUM CHLORIDE 9 MG/ML
INJECTION, SOLUTION INTRAVENOUS CONTINUOUS
Status: DISCONTINUED | OUTPATIENT
Start: 2024-06-21 | End: 2024-06-22 | Stop reason: HOSPADM

## 2024-06-21 RX ORDER — GABAPENTIN 100 MG/1
200 CAPSULE ORAL AT BEDTIME
Status: DISCONTINUED | OUTPATIENT
Start: 2024-06-21 | End: 2024-06-22 | Stop reason: HOSPADM

## 2024-06-21 RX ORDER — AMOXICILLIN 250 MG
1 CAPSULE ORAL 2 TIMES DAILY PRN
Status: DISCONTINUED | OUTPATIENT
Start: 2024-06-21 | End: 2024-06-22 | Stop reason: HOSPADM

## 2024-06-21 RX ORDER — ATORVASTATIN CALCIUM 10 MG/1
20 TABLET, FILM COATED ORAL EVERY EVENING
Status: DISCONTINUED | OUTPATIENT
Start: 2024-06-21 | End: 2024-06-22 | Stop reason: HOSPADM

## 2024-06-21 RX ORDER — IOPAMIDOL 755 MG/ML
90 INJECTION, SOLUTION INTRAVASCULAR ONCE
Status: COMPLETED | OUTPATIENT
Start: 2024-06-21 | End: 2024-06-21

## 2024-06-21 RX ORDER — AMOXICILLIN 250 MG
2 CAPSULE ORAL 2 TIMES DAILY PRN
Status: DISCONTINUED | OUTPATIENT
Start: 2024-06-21 | End: 2024-06-22 | Stop reason: HOSPADM

## 2024-06-21 RX ORDER — MORPHINE SULFATE 4 MG/ML
4 INJECTION, SOLUTION INTRAMUSCULAR; INTRAVENOUS ONCE
Status: COMPLETED | OUTPATIENT
Start: 2024-06-21 | End: 2024-06-21

## 2024-06-21 RX ORDER — ONDANSETRON 4 MG/1
4 TABLET, ORALLY DISINTEGRATING ORAL EVERY 6 HOURS PRN
Status: DISCONTINUED | OUTPATIENT
Start: 2024-06-21 | End: 2024-06-22 | Stop reason: HOSPADM

## 2024-06-21 RX ORDER — ONDANSETRON 2 MG/ML
4 INJECTION INTRAMUSCULAR; INTRAVENOUS EVERY 6 HOURS PRN
Status: DISCONTINUED | OUTPATIENT
Start: 2024-06-21 | End: 2024-06-22 | Stop reason: HOSPADM

## 2024-06-21 RX ORDER — ESCITALOPRAM OXALATE 10 MG/1
10 TABLET ORAL AT BEDTIME
Status: DISCONTINUED | OUTPATIENT
Start: 2024-06-21 | End: 2024-06-22 | Stop reason: HOSPADM

## 2024-06-21 RX ORDER — POLYETHYLENE GLYCOL 3350 17 G/17G
17 POWDER, FOR SOLUTION ORAL AT BEDTIME
Status: DISCONTINUED | OUTPATIENT
Start: 2024-06-21 | End: 2024-06-22 | Stop reason: HOSPADM

## 2024-06-21 RX ADMIN — PANTOPRAZOLE SODIUM 40 MG: 40 INJECTION, POWDER, FOR SOLUTION INTRAVENOUS at 21:49

## 2024-06-21 RX ADMIN — ESCITALOPRAM OXALATE 10 MG: 10 TABLET ORAL at 23:54

## 2024-06-21 RX ADMIN — GABAPENTIN 200 MG: 100 CAPSULE ORAL at 23:54

## 2024-06-21 RX ADMIN — IOPAMIDOL 90 ML: 755 INJECTION, SOLUTION INTRAVENOUS at 19:52

## 2024-06-21 RX ADMIN — ATORVASTATIN CALCIUM 20 MG: 10 TABLET, FILM COATED ORAL at 23:54

## 2024-06-21 RX ADMIN — SODIUM CHLORIDE: 9 INJECTION, SOLUTION INTRAVENOUS at 23:59

## 2024-06-21 RX ADMIN — MORPHINE SULFATE 4 MG: 4 INJECTION, SOLUTION INTRAMUSCULAR; INTRAVENOUS at 19:16

## 2024-06-21 ASSESSMENT — COLUMBIA-SUICIDE SEVERITY RATING SCALE - C-SSRS
2. HAVE YOU ACTUALLY HAD ANY THOUGHTS OF KILLING YOURSELF IN THE PAST MONTH?: NO
6. HAVE YOU EVER DONE ANYTHING, STARTED TO DO ANYTHING, OR PREPARED TO DO ANYTHING TO END YOUR LIFE?: NO
1. IN THE PAST MONTH, HAVE YOU WISHED YOU WERE DEAD OR WISHED YOU COULD GO TO SLEEP AND NOT WAKE UP?: NO

## 2024-06-21 ASSESSMENT — ACTIVITIES OF DAILY LIVING (ADL)
ADLS_ACUITY_SCORE: 35
ADLS_ACUITY_SCORE: 22
ADLS_ACUITY_SCORE: 35
ADLS_ACUITY_SCORE: 33

## 2024-06-21 NOTE — ED PROVIDER NOTES
EMERGENCY DEPARTMENT NOTE     Name: Alexi Bruner    Age/Sex: 69 year old male   MRN: [id number]   Evaluation Date & Time:  6/21/2024  5:34 PM    PCP:    Prasanth New   ED Provider: Wilson Street D.O.       CHIEF COMPLAINT    Melena and Abdominal Pain       DIAGNOSIS & DISPOSITION/MEDICAL DECISION MAKING     1. Epigastric pain    2. History of melena    3. Bradycardia        Alexi Bruner is a 69 year old male with relevant past history of COPD, hyperlipidemia, AUD in remission, chronic back pain who presents to the emergency department for evaluation of melena.    Differential  diagnosis considered included but not limited to bowel obstruction or perforation, GI bleeding, ACS, abdominal aortic aneurysm, endocrine process including hypothyroidism    Medical Decision Making  Triage VS:  ED Triage Vitals   Enc Vitals Group      BP 06/21/24 1638 (!) 149/87      Pulse 06/21/24 1638 55      Resp 06/21/24 1638 12      Temp 06/21/24 1638 (!) 96.7  F (35.9  C)      Temp src 06/21/24 1638 Temporal      SpO2 06/21/24 1638 95 %      Weight 06/21/24 1640 99.8 kg (220 lb)      Height 06/21/24 1640 1.829 m (6')      Head Circumference --       Peak Flow --       Pain Score --       Pain Loc --       Pain Education --       Exclude from Growth Chart --      Pertinent Physical Findings:  Abdomen: Midepigastric tenderness with local guarding but no peritoneal signs  Diagnostic studies:  EKG: Sinus rhythm, troponin 20  CT of the abdomen pelvis: No acute process  Lab: CBC and comprehensive metabolic profile within normal limits  Patient received IV morphine with improvement in pain.    Patient noted to have intermittent bradycardia with heart rate 45 on the monitor but appears to be in sinus rhythm or AV block.  I discussed options with the patient and his family.  Patient has had progressive abdominal pain and reported melena over the past week and a half.  Patient was at [facility] today for endoscopy but referred to the  ED for workup.  Discussed case with gastroenterologist and they will be able to do endoscopy tomorrow prior to discharge.  Patient will be admitted to [facility] telemetry, kept n.p.o. after midnight, PPI with GI consultation in AM.  Case was discussed with the hospitalist service Dr. Mcdonald    Interventions: IV morphine, Protonix  Discharge Vital Signs:BP (!) 145/76 (BP Location: Left arm)   Pulse 103   Temp 97.9  F (36.6  C) (Oral)   Resp 20   Ht 1.829 m (6')   Wt 98.8 kg (217 lb 12.8 oz)   SpO2 92%   BMI 29.54 kg/m       DISPOSITION: Admit to [facility] [place]    Diagnostic studies:  Imaging:  CTA Abdomen Pelvis with Contrast   Final Result   IMPRESSION:   1.  No evidence for acute GI bleed.   2.  Mild atherosclerotic disease.   3.  Tiny groundglass nodules both lower lung fields, nonspecific, but could represent pneumonia.    4.  No other findings to account for the patient's symptoms.         Lab:  Labs Ordered and Resulted from Time of ED Arrival to Time of ED Departure   INR - Normal       Result Value    INR 1.00     COMPREHENSIVE METABOLIC PANEL - Normal    Sodium 141      Potassium 4.5      Carbon Dioxide (CO2) 23      Anion Gap 12      Urea Nitrogen 11.9      Creatinine 0.88      GFR Estimate >90      Calcium 9.4      Chloride 106      Glucose 95      Alkaline Phosphatase 138      AST 26      ALT 35      Protein Total 7.9      Albumin 4.3      Bilirubin Total 0.7     TROPONIN T, HIGH SENSITIVITY - Normal    Troponin T, High Sensitivity 20     TSH WITH FREE T4 REFLEX - Normal    TSH 1.39     CBC WITH PLATELETS AND DIFFERENTIAL    WBC Count 7.2      RBC Count 5.43      Hemoglobin 16.6      Hematocrit 49.7      MCV 92      MCH 30.6      MCHC 33.4      RDW 13.2      Platelet Count 237      % Neutrophils 51      % Lymphocytes 36      % Monocytes 7      % Eosinophils 5      % Basophils 1      % Immature Granulocytes 1      NRBCs per 100 WBC 0      Absolute Neutrophils 3.6      Absolute  "Lymphocytes 2.6      Absolute Monocytes 0.5      Absolute Eosinophils 0.3      Absolute Basophils 0.1      Absolute Immature Granulocytes 0.0      Absolute NRBCs 0.0     TYPE AND SCREEN, ADULT    ABO/RH(D) A POS      Antibody Screen Negative      SPECIMEN EXPIRATION DATE [id number]     ABO/RH TYPE AND SCREEN               Triage note reviewed:The patient was seen by his PCP a week and a half ago for RUQ,LUQ , and epigastric pain. Pt was referred to [facility] for upper endoscopy and he reported he had black stools so he was unable to schedule the endoscopy. Pt was prescribed omeprazole and he did not start it. The pt c/o a burning sensation. Also c/o one BM daily black in color x 1 week. \"I have just pellets when I have a bowel movement.\"             History:  Supplemental history from: Family Member/Significant Other patient's wife and son  External Record(s) reviewed: PMR outpatient visit November 28, 2023    Work Up:  Chart documentation includes differential considered and any EKGs or imaging independently interpreted by provider, where specified.  In additional to work up documented, I considered the following work up: NA    External consultation:  Discussion of management with another provider: Gastroenterologist, hospitalist    Complicating factors:  Care impacted by chronic illness: Chronic Pain  Care affected by social determinants of health: Access to medical care    Disposition considerations: Admit.    At the conclusion of the encounter I discussed the results of all of the tests and the disposition. The questions were answered. The patient or family acknowledged understanding and was agreeable with the care plan.    TOTAL CRITICAL CARE TIME (EXCLUDING PROCEDURES): Not applicable    PROCEDURES:   None    EMERGENCY DEPARTMENT COURSE   6:08 PM I met with the patient to gather history and to perform my initial exam.  We discussed treatment options and the plan for care while in the Emergency " Department.    ED INTERVENTIONS     Medications   escitalopram (LEXAPRO) tablet 10 mg (10 mg Oral $Given 6/21/24 2354)   atorvastatin (LIPITOR) tablet 20 mg (20 mg Oral $Given 6/21/24 2354)   gabapentin (NEURONTIN) capsule 100 mg (has no administration in time range)   gabapentin (NEURONTIN) capsule 200 mg (200 mg Oral $Given 6/21/24 2354)   polyethylene glycol (MIRALAX) Packet 17 g ( Oral Automatically Held 6/27/24 2200)   umeclidinium-vilanterol (ANORO ELLIPTA) 62.5-25 MCG/ACT oral inhaler 1 puff (has no administration in time range)   senna-docusate (SENOKOT-S/PERICOLACE) 8.6-50 MG per tablet 1 tablet (has no administration in time range)     Or   senna-docusate (SENOKOT-S/PERICOLACE) 8.6-50 MG per tablet 2 tablet (has no administration in time range)   ondansetron (ZOFRAN ODT) ODT tab 4 mg (has no administration in time range)     Or   ondansetron (ZOFRAN) injection 4 mg (has no administration in time range)   sodium chloride 0.9 % infusion ( Intravenous $New Bag 6/21/24 2359)   pantoprazole (PROTONIX) IV push injection 40 mg (has no administration in time range)   morphine (PF) injection 4 mg (4 mg Intravenous $Given 6/21/24 1916)   iopamidol (ISOVUE-370) solution 90 mL (90 mLs Intravenous $Given 6/21/24 1952)   pantoprazole (PROTONIX) IV push injection 40 mg (40 mg Intravenous $Given 6/21/24 2149)       DISCHARGE MEDICATIONS        Review of your medicines        UNREVIEWED medicines. Ask your doctor about these medicines        Dose / Directions   albuterol 108 (90 Base) MCG/ACT inhaler  Commonly known as: PROAIR HFA/PROVENTIL HFA/VENTOLIN HFA      Dose: 1 puff  1 puff  Refills: 0     Anoro Ellipta 62.5-25 MCG/ACT oral inhaler  Generic drug: umeclidinium-vilanterol      Dose: 1 puff  1 puff  Refills: 0     atorvastatin 20 MG tablet  Commonly known as: LIPITOR      Dose: 20 mg  Take 20 mg by mouth  Refills: 0     escitalopram 10 MG tablet  Commonly known as: LEXAPRO      Dose: 10 mg  Take 10 mg by mouth at  "bedtime  Refills: 0     gabapentin 100 MG capsule  Commonly known as: NEURONTIN  Used for: Radicular pain of thoracic region, Myofascial pain      100mg in the morning and afternoon and 200mg at bedtime  Quantity: 120 capsule  Refills: 1     polyethylene glycol 17 g packet  Commonly known as: MIRALAX      Dose: 1 packet  Take 1 packet by mouth at bedtime  Refills: 0                INFORMATION SOURCE AND LIMITATIONS    History/Exam limitations: NA  Patient information was obtained from: patient and family  Use of : N/A    HISTORY OF PRESENT ILLNESS   Alexi Bruner is a 69 year old year old male with a relevant past history of chronic lower back pain, who presents to the ED  for evaluation of melena and abdominal pain.    The patient presents with black stools and abdominal pain that have been intermittent over the last few weeks. His most recent black stool was yesterday. Most times he has a bowel movement he has difficulty and \"I feel like gas shoots the pellets out\". His abdominal pain is primarily in his LUQ and is a burning pain \"like it is on fire\". He has spoken with his PCP and [facility] for this. They prescribed omeprazole but he has not had the chance to take any of it. The only time he has had similar symptoms was ~1 year ago when he had constipation but no melena at that time. He occasionally has bilateral feet tingling.     No alcohol use. No excessive ibuprofen or aspirin use. He denies chest pain or any other complaints at this time.     REVIEW OF SYSTEMS:   All other systems reviewed and are negative except as noted above in HPI.    PATIENT HISTORY     Past Medical History:   Diagnosis Date    Alcohol dependence in remission since 2021 06/21/2024    Anxiety     Compression fracture of L1 vertebra with routine healing 06/01/2022    COPD (chronic obstructive pulmonary disease) (H) 06/21/2024    Hyperlipidemia      Patient Active Problem List   Diagnosis    Chronic bilateral low back pain without " sciatica    Pain in thoracic spine    Bradycardia    Epigastric pain    Melena    Anxiety    Hyperlipidemia    Compression fracture of L1 vertebra with routine healing    COPD (chronic obstructive pulmonary disease) (H)    Alcohol dependence in remission since 2021     Past Surgical History:   Procedure Laterality Date    ARTHROSCOPY SHOULDER ROTATOR CUFF REPAIR, BICEP TENODESIS REPAIR Right     HERNIORRHAPHY INGUINAL  2004    LUMBAR DISCECTOMY  1995    L5    VASECTOMY  1986       Allergies   Allergen Reactions    Bupropion Other (See Comments)       OUTPATIENT MEDICATIONS     Current Discharge Medication List         Vitals:    06/21/24 2158 06/21/24 2244 06/21/24 2251 06/21/24 2316   BP: 130/72 (!) 145/76  (!) 145/76   BP Location:  Right arm  Left arm   Pulse: (!) 48 50  103   Resp:  20  20   Temp:  97.8  F (36.6  C)  97.9  F (36.6  C)   TempSrc:  Oral  Oral   SpO2: 94% 93%  92%   Weight:   98.8 kg (217 lb 12.8 oz)    Height:           Physical Exam   Constitutional: Oriented to person, place, and time. Appears well-developed and well-nourished.   Cardiovascular: Normal rate, regular rhythm and normal heart sounds.    Pulmonary/Chest: Normal effort  and breath sounds normal.   Abdominal: Soft. Bowel sounds are normal. Mild epigastric tenderness.  Musculoskeletal: Normal range of motion.   Neurological: Alert and oriented to person, place, and time. Normal strength.   Skin: Skin is warm and dry.   Psychiatric: Normal mood and affect. Behavior is normal. Thought content normal.     DIAGNOSTICS    LABORATORY FINDINGS (REVIEWED AND INTERPRETED):  Labs Ordered and Resulted from Time of ED Arrival to Time of ED Departure   INR - Normal       Result Value    INR 1.00     COMPREHENSIVE METABOLIC PANEL - Normal    Sodium 141      Potassium 4.5      Carbon Dioxide (CO2) 23      Anion Gap 12      Urea Nitrogen 11.9      Creatinine 0.88      GFR Estimate >90      Calcium 9.4      Chloride 106      Glucose 95      Alkaline  Phosphatase 138      AST 26      ALT 35      Protein Total 7.9      Albumin 4.3      Bilirubin Total 0.7     TROPONIN T, HIGH SENSITIVITY - Normal    Troponin T, High Sensitivity 20     TSH WITH FREE T4 REFLEX - Normal    TSH 1.39     CBC WITH PLATELETS AND DIFFERENTIAL    WBC Count 7.2      RBC Count 5.43      Hemoglobin 16.6      Hematocrit 49.7      MCV 92      MCH 30.6      MCHC 33.4      RDW 13.2      Platelet Count 237      % Neutrophils 51      % Lymphocytes 36      % Monocytes 7      % Eosinophils 5      % Basophils 1      % Immature Granulocytes 1      NRBCs per 100 WBC 0      Absolute Neutrophils 3.6      Absolute Lymphocytes 2.6      Absolute Monocytes 0.5      Absolute Eosinophils 0.3      Absolute Basophils 0.1      Absolute Immature Granulocytes 0.0      Absolute NRBCs 0.0     TYPE AND SCREEN, ADULT    ABO/RH(D) A POS      Antibody Screen Negative      SPECIMEN EXPIRATION DATE [id number]     ABO/RH TYPE AND SCREEN         IMAGING (REVIEWED AND INTERPRETED):  CTA Abdomen Pelvis with Contrast   Final Result   IMPRESSION:   1.  No evidence for acute GI bleed.   2.  Mild atherosclerotic disease.   3.  Tiny groundglass nodules both lower lung fields, nonspecific, but could represent pneumonia.    4.  No other findings to account for the patient's symptoms.            ECG (REVIEWED AND INTERPRETED):   ECG:   Performed at: 18:56  HR:  47 bpm  Rhythm: Sinus  Axis: -35  QRS duration: 94 ms  QTC: 423 ms  ST changes: No ST segment elevation or depression, no T wave inversion,No Q wave  Interpretation: Sinus Bradycardia  No prior for comparison    I have reviewed the patient's ECG, with comments made as listed above. Please see scanned image for full interpretation.         I, Afshin Mills, am serving as a scribe to document services personally performed by Wilson Street D.O., based on my observation and the provider s statements to me.    I, Wilson Street D.O., attest that Afshin Mills is acting in a  scribe [facility], has observed my performance of the services and has documented them in accordance with my direction.    Wilson Street D.O.  EMERGENCY MEDICINE   06/21/24  [facility] EMERGENCY DEPARTMENT  [address] [place] [id number]  [phone]  Dept: [phone]      Wilson Street DO  06/22/24 0149

## 2024-06-21 NOTE — ED TRIAGE NOTES
"The patient was seen by his PCP a week and a half ago for RUQ,LUQ , and epigastric pain. Pt was referred to [facility] for upper endoscopy and he reported he had black stools so he was unable to schedule the endoscopy. Pt was prescribed omeprazole and he did not start it. The pt c/o a burning sensation. Also c/o one BM daily black in color x 1 week. \"I have just pellets when I have a bowel movement.\"         "

## 2024-06-22 VITALS
SYSTOLIC BLOOD PRESSURE: 111 MMHG | TEMPERATURE: 98.1 F | WEIGHT: 217.8 LBS | OXYGEN SATURATION: 92 % | BODY MASS INDEX: 29.5 KG/M2 | HEART RATE: 48 BPM | DIASTOLIC BLOOD PRESSURE: 67 MMHG | HEIGHT: 72 IN | RESPIRATION RATE: 16 BRPM

## 2024-06-22 LAB
ANION GAP SERPL CALCULATED.3IONS-SCNC: 9 MMOL/L (ref 7–15)
ATRIAL RATE - MUSE: 47 BPM
BUN SERPL-MCNC: 10.3 MG/DL (ref 8–23)
CALCIUM SERPL-MCNC: 8.7 MG/DL (ref 8.8–10.2)
CHLORIDE SERPL-SCNC: 107 MMOL/L (ref 98–107)
CREAT SERPL-MCNC: 0.86 MG/DL (ref 0.67–1.17)
DEPRECATED HCO3 PLAS-SCNC: 25 MMOL/L (ref 22–29)
DIASTOLIC BLOOD PRESSURE - MUSE: 80 MMHG
EGFRCR SERPLBLD CKD-EPI 2021: >90 ML/MIN/1.73M2
ERYTHROCYTE [DISTWIDTH] IN BLOOD BY AUTOMATED COUNT: 13.2 % (ref 10–15)
GLUCOSE SERPL-MCNC: 97 MG/DL (ref 70–99)
HCT VFR BLD AUTO: 43.8 % (ref 40–53)
HGB BLD-MCNC: 14.9 G/DL (ref 13.3–17.7)
INTERPRETATION ECG - MUSE: NORMAL
MCH RBC QN AUTO: 30.8 PG (ref 26.5–33)
MCHC RBC AUTO-ENTMCNC: 34 G/DL (ref 31.5–36.5)
MCV RBC AUTO: 91 FL (ref 78–100)
P AXIS - MUSE: 55 DEGREES
PLATELET # BLD AUTO: 218 10E3/UL (ref 150–450)
POTASSIUM SERPL-SCNC: 4 MMOL/L (ref 3.4–5.3)
PR INTERVAL - MUSE: 186 MS
QRS DURATION - MUSE: 94 MS
QT - MUSE: 478 MS
QTC - MUSE: 423 MS
R AXIS - MUSE: -35 DEGREES
RBC # BLD AUTO: 4.83 10E6/UL (ref 4.4–5.9)
SODIUM SERPL-SCNC: 141 MMOL/L (ref 135–145)
SYSTOLIC BLOOD PRESSURE - MUSE: 141 MMHG
T AXIS - MUSE: 80 DEGREES
UPPER GI ENDOSCOPY: NORMAL
VENTRICULAR RATE- MUSE: 47 BPM
WBC # BLD AUTO: 5.9 10E3/UL (ref 4–11)

## 2024-06-22 PROCEDURE — 88305 TISSUE EXAM BY PATHOLOGIST: CPT | Mod: TC | Performed by: INTERNAL MEDICINE

## 2024-06-22 PROCEDURE — 250N000013 HC RX MED GY IP 250 OP 250 PS 637: Performed by: INTERNAL MEDICINE

## 2024-06-22 PROCEDURE — 43239 EGD BIOPSY SINGLE/MULTIPLE: CPT | Performed by: INTERNAL MEDICINE

## 2024-06-22 PROCEDURE — 250N000011 HC RX IP 250 OP 636: Performed by: INTERNAL MEDICINE

## 2024-06-22 PROCEDURE — 36415 COLL VENOUS BLD VENIPUNCTURE: CPT | Performed by: INTERNAL MEDICINE

## 2024-06-22 PROCEDURE — G0500 MOD SEDAT ENDO SERVICE >5YRS: HCPCS | Performed by: INTERNAL MEDICINE

## 2024-06-22 PROCEDURE — 80048 BASIC METABOLIC PNL TOTAL CA: CPT | Performed by: INTERNAL MEDICINE

## 2024-06-22 PROCEDURE — 258N000003 HC RX IP 258 OP 636: Mod: JZ | Performed by: INTERNAL MEDICINE

## 2024-06-22 PROCEDURE — 99239 HOSP IP/OBS DSCHRG MGMT >30: CPT | Mod: FS | Performed by: FAMILY MEDICINE

## 2024-06-22 PROCEDURE — 99207 PR APP CREDIT; MD BILLING SHARED VISIT: CPT

## 2024-06-22 PROCEDURE — G0378 HOSPITAL OBSERVATION PER HR: HCPCS

## 2024-06-22 PROCEDURE — 85027 COMPLETE CBC AUTOMATED: CPT | Performed by: INTERNAL MEDICINE

## 2024-06-22 PROCEDURE — 96376 TX/PRO/DX INJ SAME DRUG ADON: CPT

## 2024-06-22 PROCEDURE — 250N000011 HC RX IP 250 OP 636: Mod: JZ | Performed by: INTERNAL MEDICINE

## 2024-06-22 PROCEDURE — C9113 INJ PANTOPRAZOLE SODIUM, VIA: HCPCS | Mod: JZ | Performed by: INTERNAL MEDICINE

## 2024-06-22 RX ORDER — FLUMAZENIL 0.1 MG/ML
0.2 INJECTION, SOLUTION INTRAVENOUS
Status: CANCELLED | OUTPATIENT
Start: 2024-06-22 | End: 2024-06-22

## 2024-06-22 RX ORDER — FENTANYL CITRATE 50 UG/ML
50-100 INJECTION, SOLUTION INTRAMUSCULAR; INTRAVENOUS EVERY 5 MIN PRN
Status: DISCONTINUED | OUTPATIENT
Start: 2024-06-22 | End: 2024-06-22 | Stop reason: HOSPADM

## 2024-06-22 RX ORDER — NALOXONE HYDROCHLORIDE 0.4 MG/ML
0.2 INJECTION, SOLUTION INTRAMUSCULAR; INTRAVENOUS; SUBCUTANEOUS
Status: DISCONTINUED | OUTPATIENT
Start: 2024-06-22 | End: 2024-06-22 | Stop reason: HOSPADM

## 2024-06-22 RX ORDER — NALOXONE HYDROCHLORIDE 0.4 MG/ML
0.2 INJECTION, SOLUTION INTRAMUSCULAR; INTRAVENOUS; SUBCUTANEOUS
Status: CANCELLED | OUTPATIENT
Start: 2024-06-22

## 2024-06-22 RX ORDER — NALOXONE HYDROCHLORIDE 0.4 MG/ML
0.4 INJECTION, SOLUTION INTRAMUSCULAR; INTRAVENOUS; SUBCUTANEOUS
Status: CANCELLED | OUTPATIENT
Start: 2024-06-22

## 2024-06-22 RX ORDER — SIMETHICONE 40MG/0.6ML
133 SUSPENSION, DROPS(FINAL DOSAGE FORM)(ML) ORAL
Status: DISCONTINUED | OUTPATIENT
Start: 2024-06-22 | End: 2024-06-22 | Stop reason: HOSPADM

## 2024-06-22 RX ORDER — PANTOPRAZOLE SODIUM 40 MG/1
40 TABLET, DELAYED RELEASE ORAL
Qty: 60 TABLET | Refills: 1 | Status: SHIPPED | OUTPATIENT
Start: 2024-06-22

## 2024-06-22 RX ORDER — SUCRALFATE 1 G/1
1 TABLET ORAL
Status: DISCONTINUED | OUTPATIENT
Start: 2024-06-22 | End: 2024-06-22 | Stop reason: HOSPADM

## 2024-06-22 RX ORDER — NALOXONE HYDROCHLORIDE 0.4 MG/ML
0.4 INJECTION, SOLUTION INTRAMUSCULAR; INTRAVENOUS; SUBCUTANEOUS
Status: DISCONTINUED | OUTPATIENT
Start: 2024-06-22 | End: 2024-06-22 | Stop reason: HOSPADM

## 2024-06-22 RX ORDER — FENTANYL CITRATE 50 UG/ML
INJECTION, SOLUTION INTRAMUSCULAR; INTRAVENOUS PRN
Status: DISCONTINUED | OUTPATIENT
Start: 2024-06-22 | End: 2024-06-22 | Stop reason: HOSPADM

## 2024-06-22 RX ORDER — SUCRALFATE 1 G/1
1 TABLET ORAL
Qty: 120 TABLET | Refills: 1 | Status: SHIPPED | OUTPATIENT
Start: 2024-06-22

## 2024-06-22 RX ORDER — LIDOCAINE 40 MG/G
CREAM TOPICAL
Status: CANCELLED | OUTPATIENT
Start: 2024-06-22

## 2024-06-22 RX ORDER — ATROPINE SULFATE 0.1 MG/ML
1 INJECTION INTRAVENOUS
Status: DISCONTINUED | OUTPATIENT
Start: 2024-06-22 | End: 2024-06-22 | Stop reason: HOSPADM

## 2024-06-22 RX ORDER — DIPHENHYDRAMINE HYDROCHLORIDE 50 MG/ML
25-50 INJECTION INTRAMUSCULAR; INTRAVENOUS
Status: DISCONTINUED | OUTPATIENT
Start: 2024-06-22 | End: 2024-06-22 | Stop reason: HOSPADM

## 2024-06-22 RX ORDER — FLUMAZENIL 0.1 MG/ML
0.2 INJECTION, SOLUTION INTRAVENOUS
Status: DISCONTINUED | OUTPATIENT
Start: 2024-06-22 | End: 2024-06-22 | Stop reason: HOSPADM

## 2024-06-22 RX ORDER — EPINEPHRINE 1 MG/ML
0.1 INJECTION, SOLUTION, CONCENTRATE INTRAVENOUS
Status: DISCONTINUED | OUTPATIENT
Start: 2024-06-22 | End: 2024-06-22 | Stop reason: HOSPADM

## 2024-06-22 RX ADMIN — SODIUM CHLORIDE: 9 INJECTION, SOLUTION INTRAVENOUS at 09:09

## 2024-06-22 RX ADMIN — UMECLIDINIUM BROMIDE AND VILANTEROL TRIFENATATE 1 PUFF: 62.5; 25 POWDER RESPIRATORY (INHALATION) at 09:21

## 2024-06-22 RX ADMIN — SUCRALFATE 1 G: 1 TABLET ORAL at 11:39

## 2024-06-22 RX ADMIN — PANTOPRAZOLE SODIUM 40 MG: 40 INJECTION, POWDER, FOR SOLUTION INTRAVENOUS at 09:13

## 2024-06-22 ASSESSMENT — ACTIVITIES OF DAILY LIVING (ADL)
ADLS_ACUITY_SCORE: 22
DEPENDENT_IADLS:: INDEPENDENT
ADLS_ACUITY_SCORE: 22

## 2024-06-22 ASSESSMENT — ENCOUNTER SYMPTOMS
CONSTIPATION: 0
ABDOMINAL PAIN: 1
BLOOD IN STOOL: 0
NAUSEA: 0
VOMITING: 0

## 2024-06-22 NOTE — PROGRESS NOTES
Patient admitted to room 16 at approximately 2245 via wheel chair from emergency room.  Reason for Admission:  Melena  Report received from: Prasanth Cole RN   Patient was accompanied by Self.  Discharge transportation provided by:  Patient ambulated/transferred:  independently. self.  Patient is alert and orientated x 4.  Outpatient Observation education provided to: Patient  MDRO Education done if applicable (MRSA, VRE, etc)  Safety risks were identified during admission:  none.   Yellow risk/fall band applied:  No  Home meds sent home: No  Home meds sent to pharmacy:No IF YES add some kind of note to chart for discharging nurse (see 1/2 sheet of laminated paper in HUC drawer)  Detailed Belongings: Conor Sultana RN

## 2024-06-22 NOTE — PLAN OF CARE
"PRIMARY DIAGNOSIS: \"GENERIC\" NURSING  OUTPATIENT/OBSERVATION GOALS TO BE MET BEFORE DISCHARGE:  ADLs back to baseline: Yes    Activity and level of assistance: Ambulating independently.    Pain status: Improved-controlled with oral pain medications.    Return to near baseline physical activity: Yes     Discharge Planner Nurse   Safe discharge environment identified: Yes  Barriers to discharge: Yes       Entered by: Shanice Diana RN 06/22/2024 11:50 AM     Please review provider order for any additional goals.   Nurse to notify provider when observation goals have been met and patient is ready for discharge.Goal Outcome Evaluation:       Pt had EGD performed. Pt AxO4, VSS, RA. Pt ambulating to bathroom independently. Telemetry sinus gayathri.                 "

## 2024-06-22 NOTE — PLAN OF CARE
Problem: Pain Acute  Goal: Optimal Pain Control and Function  6/22/2024 1342 by Shanice Diana, RN  Outcome: Adequate for Care Transition  6/22/2024 1150 by Shanice Diana, RN  Outcome: Progressing  Intervention: Develop Pain Management Plan  Recent Flowsheet Documentation  Taken 6/22/2024 0912 by Shanice Diana RN  Pain Management Interventions: medication (see MAR)   Goal Outcome Evaluation:      Plan of Care Reviewed With: patient      Patient discharged to home at 1400 via Private Car.  Accompanied by spouse and son and staff.  Discharge instructions were reviewed with patient, spouse, and son, opportunity offered to ask questions.    Prescriptions faxed to pharmacy.  Access discontinued: Yes  Care plan and education discontinued: Yes  Home meds retrieved from pharmacy: No  Belongings were sent home with patient/family:  Cell phone/electronics:   and Clothing: Shirt(s):  , Pants:  , and Outerwear:   .

## 2024-06-22 NOTE — H&P
I have reviewed the  H&P and examined the patient. There are no significant changes.  ERICKA HEAD MD   [facility]

## 2024-06-22 NOTE — ED NOTES
[facility] ED Handoff Report    ED Chief Complaint: Abdominal pain    ED Diagnosis:  (R10.13) Epigastric pain  Comment:   Plan: manage pain    (Z87.19) History of melena  Comment:   Plan: GI, scope    (R00.1) Bradycardia  Comment:   Plan: monitor       PMH:    Past Medical History:   Diagnosis Date    Anxiety     Compression fracture of L1 vertebra with routine healing 06/01/2022    Hyperlipidemia         Code Status:  No Order     Falls Risk: No Band: Not applicable    Current Living Situation/Residence: lives with a significant other     Elimination Status: Continent: Yes     Activity Level: Independent    Patients Preferred Language:  English     Needed: No    Vital Signs:  BP (!) 141/64   Pulse (!) 49   Temp (!) 96.7  F (35.9  C) (Temporal)   Resp 23   Ht 1.829 m (6')   Wt 99.8 kg (220 lb)   SpO2 94%   BMI 29.84 kg/m       Cardiac Rhythm: SB    Pain Score: 2/10    Is the Patient Confused:  No    Last Food or Drink: 06/21/24 at 1500    Focused Assessment:  LUQ epigastric pain, tender and diffused to RUQ. Lungs clear bilateral with good chest rise. SB with regular rhythm and rate. Pain managed at 2/10. +2 pulses, no edema noted, endorses black stools (last one 6/20/24), denies nausea, CP, SOB, independent and AO x4.     Tests Performed: Done: Labs and Imaging    Treatments Provided:  Morphine, Protonix    Family Dynamics/Concerns: No    Family Updated On Visitor Policy: Yes    Plan of Care Communicated to Family: Yes    Who Was Updated about Plan of Care: Wife    Belongings Checklist Done and Signed by Patient: Yes    Medications sent with patient:     Covid: asymptomatic , NOT tested    Additional Information: GI plan to scope 06/22/24 AM    RN: Prasanth Cole, RN   6/21/2024 10:00 PM

## 2024-06-22 NOTE — H&P
[facility]    History and Physical - Hospitalist Service       Date of Admission:  6/21/2024    Assessment & Plan      70 yo M with h/o COPD, HLD, previous Etoh dep in remission, and chronic lower thoracic back pain radiating to RUQ who presents with 1 week of new LUQ abd pain radiating to epig/RUQ area, post-prandial bloating, and black stools.  Unclear etiology - denies NSAIDs or BRBPR.  Hgb is normal so unclear if this is true melena but no other reason for black stools.  Plan to admit for observation and GI has been consulted and they are reportedly planning to perform EGD in AM.  Also, has sinus bradycardia which is asymptomatic and appears to be new of unclear etiology. Trop negative.  Will follow on telemetry.  If persists may need follow up with cardiology.  Trend hgb and vitals.  Protonix.    Principal Problem:    Possible Melena - black stools as above or 1 week but hgb stable.  But it is associated with new onset abd pain LUQ radiating across epig area to RUQ and also post-prandial bloating so reasonable to get GI consult with EGD.  Protonix, keep NPO until EGD.  Hydrate gently.      Sinus Bradycardia - rate in upper 40's in ED - unclear etiology, appears to be new based on review of vitals from previous clinic visits.  He is asymptomatic with this.  EKG does not show any acute changes otherwise.  Trop negative.  Denies CP or dyspnea or dizziness.  Follow overnight.  If worsens could get cardiology consult now.  If stable to improves may be reasonable to do 72 hour holter as outpatient and follow up with cardiology outpatient.    Active Problems:    Chronic lower thoracic pain radiating to RUQ  - this has been going on for a couple of years - has had workup with MRI's and CT's and PM&R/spine without obvious etiology  - continue neurontin as at home      Anxiety  - continue Lexapro as at home      Hyperlipidemia  - continue lipitor as at home      COPD (chronic obstructive  pulmonary disease)  - continue Anoro ellipta as at home (rarely ever uses albuterol)      Alcohol dependence in remission since 2021  - used to drink 4-5 beers/day for many years  - no obvious h/o liver issues. Will just follow for now.            Diet: NPO for Medical/Clinical Reasons Except for: No Exceptions    DVT Prophylaxis: Low Risk/Ambulatory with no VTE prophylaxis indicated  Majano Catheter: Not present  Lines: None     Cardiac Monitoring: None  Code Status:  full    Clinically Significant Risk Factors Present on Admission                              # Overweight: Estimated body mass index is 29.84 kg/m  as calculated from the following:    Height as of this encounter: 1.829 m (6').    Weight as of this encounter: 99.8 kg (220 lb).       # COPD: noted on problem list        Disposition Plan     Medically Ready for Discharge: Anticipated Tomorrow           Lincoln Mcdonald MD  Hospitalist Service  [facility]  Securely message with [employer] (more info)  Text page via [employer] Paging/Directory     ______________________________________________________________________    Chief Complaint   Black stools with abd pain for 1 week    History is obtained from the patient and electronic health record    History of Present Illness   68 yo M with h/o COPD, HLD, previous Etoh dep in remission, and chronic lower thoracic back pain radiating to RUQ who presents with 1 week of new LUQ abd pain radiating to epig/RUQ area, post-prandial bloating, and black stools.  He has never had anything like this before.  He cannot think of anything but would have caused this.  He denies any NSAID use.  He does have a history of alcohol dependence but denies any use for 2 and half years.  He does have this chronic lower thoracic back pain that radiates around to his right upper quadrant for the last 1 to 2 years but that seems different than the pain and bloating that he has been having in the last week or so.  But  recent pain starts in his left upper quadrant and radiates across his epigastrium into his right upper quadrant.  It is also associated with bloating about 15 minutes after eating and gets very gassy.  He denies any fevers or chills or sweats or nausea or vomiting.  Stools he feels are jet black and sometimes loose and sometimes like pellets.      Past Medical History    Past Medical History:   Diagnosis Date    Alcohol dependence in remission since 2024    Anxiety     Compression fracture of L1 vertebra with routine healing 2022    COPD (chronic obstructive pulmonary disease) (H) 2024    Hyperlipidemia        Past Surgical History   Past Surgical History:   Procedure Laterality Date    ARTHROSCOPY SHOULDER ROTATOR CUFF REPAIR, BICEP TENODESIS REPAIR Right     HERNIORRHAPHY INGUINAL  2004    LUMBAR DISCECTOMY      L5    VASECTOMY         Prior to Admission Medications   Prior to Admission Medications   Prescriptions Last Dose Informant Patient Reported? Taking?   albuterol (PROAIR HFA/PROVENTIL HFA/VENTOLIN HFA) 108 (90 Base) MCG/ACT inhaler Unknown at prn Self Yes Yes   Si puff   atorvastatin (LIPITOR) 20 MG tablet 2024 at hs Self Yes Yes   Sig: Take 20 mg by mouth   escitalopram (LEXAPRO) 10 MG tablet 2024 at hs Self Yes Yes   Sig: Take 10 mg by mouth at bedtime   gabapentin (NEURONTIN) 100 MG capsule 2024 at hs Self No Yes   Simg in the morning and afternoon and 200mg at bedtime   polyethylene glycol (MIRALAX) 17 g packet 2024 at hs Self Yes Yes   Sig: Take 1 packet by mouth at bedtime   umeclidinium-vilanterol (ANORO ELLIPTA) 62.5-25 MCG/ACT oral inhaler 2024 at am Self Yes Yes   Si puff      Facility-Administered Medications: None        Review of Systems    The 10 point Review of Systems is negative other than noted in the HPI    Social History   I have reviewed this patient's social history and updated it with pertinent information if  needed.  Social History     Tobacco Use    Smoking status: Every Day     Current packs/day: 0.50     Average packs/day: 1.2 packs/day for 50.5 years (59.2 ttl pk-yrs)     Types: Cigarettes     Start date: 1974    Smokeless tobacco: Never   Substance Use Topics    Alcohol use: Not Currently     Alcohol/week: 30.0 standard drinks of alcohol     Types: 30 Cans of beer per week     Comment: 4-5 beers per day for many years quit 2021    Drug use: Not Currently         Family History   I have reviewed this patient's family history and updated it with pertinent information if needed.  Family History   Problem Relation Age of Onset    Hypertension Mother     Asthma Father     Hypertension Father     Diabetes Paternal Uncle         Physical Exam   Vital Signs: Temp: (!) 96.7  F (35.9  C) Temp src: Temporal BP: 130/72 Pulse: (!) 48   Resp: 23 SpO2: 94 % O2 Device: None (Room air)    Weight: 220 lbs 0 oz    General Appearance:  Older male in no acute distress   Head:    Normocephalic, without obvious abnormality, atraumatic   Eyes:    PERRL, conjunctiva/corneas clear, EOM's intact,both eyes    Ears:    Normal external ear canals no drainage or erythema bilat.   Nose:   Nares normal by gross inspection,  mucosa normal, no drainage or sinus tenderness   Throat:   Lips, mucosa, and tongue normal; teeth and gums normal   Neck:   Supple, symmetrical, trachea midline, no adenopathy;        thyroid:  No enlargement/tenderness/nodules   Back:     Symmetric, no curvature, ROM normal, no CVA tenderness   Lungs:   Clear to auscultation   Chest wall:    No tenderness or deformity   Heart:  Bradycardic rate and normal rhythm, S1 and S2 normal, I/VI systolic murmur, no rubs, no JVD, no edema   Abdomen:     Soft, tender across his upper abdomen, no rebound, bowel sounds active all four quadrants, no masses, no hepatosplenomegaly   Musculoskeletal:   Extremities are warm and non-tender, atraumatic, no joint swelling or tenderness   Pulses:    2+ and symmetric all extremities   Skin:   Skin color, texture, turgor normal, no rashes or lesions on exposed areas, please see nursing assessment for full skin assessment   Neurologic:      Psychiatric: Alert, oriented, cranial nerves II through XII intact, motor 5/5 upper lower extremity symmetric, sensory grossly intact to light touch    Affect is calm and normal        Medical Decision Making       75 MINUTES SPENT BY ME on the date of service doing chart review, history, exam, documentation & further activities per the note.      Data     I have personally reviewed the following data over the past 24 hrs:    7.2  \   16.6   / 237     141 106 11.9 /  95   4.5 23 0.88 \     ALT: 35 AST: 26 AP: 138 TBILI: 0.7   ALB: 4.3 TOT PROTEIN: 7.9 LIPASE: N/A     Trop: 20 BNP: N/A     TSH: 1.39 T4: N/A A1C: N/A     INR:  1.00 PTT:  N/A   D-dimer:  N/A Fibrinogen:  N/A       Imaging results reviewed over the past 24 hrs:   Recent Results (from the past 24 hour(s))   CTA Abdomen Pelvis with Contrast    Narrative    EXAM: CTA ABDOMEN PELVIS WITH CONTRAST  LOCATION: [facility]  DATE: 6/21/2024    INDICATION: Upper Abdominal Pain melena  COMPARISON: CT abdomen and pelvis 3/24/2023  TECHNIQUE: CT angiogram abdomen pelvis during arterial phase of injection of IV contrast. 2D and 3D MIP reconstructions were performed by the CT technologist. Dose reduction techniques were used.  CONTRAST: isovue 370 90ml    FINDINGS:  ANGIOGRAM ABDOMEN/PELVIS: Atherosclerotic disease abdominal aorta and iliac arteries. Infrarenal abdominal aorta upper range of normal in size measuring 2.9 x 2.7 cm. No evidence for dissection. The celiac trunk, superior mesenteric artery, inferior   mesenteric artery, and both renal arteries are patent without significant narrowing. No evidence for extravasation of contrast into the GI tract.    LOWER CHEST: Tiny groundglass nodules visualized lower lung fields.    HEPATOBILIARY:  Normal.    PANCREAS: Normal.    SPLEEN: Normal.    ADRENAL GLANDS: Normal.    KIDNEYS/BLADDER: Normal.    BOWEL: No evidence for bowel obstruction. No bowel wall thickening or inflammatory changes. No evidence for acute hemorrhage in the GI tract.     LYMPH NODES: Normal.    PELVIC ORGANS: Normal.    MUSCULOSKELETAL: Normal.      Impression    IMPRESSION:  1.  No evidence for acute GI bleed.  2.  Mild atherosclerotic disease.  3.  Tiny groundglass nodules both lower lung fields, nonspecific, but could represent pneumonia.   4.  No other findings to account for the patient's symptoms.

## 2024-06-22 NOTE — MEDICATION SCRIBE - ADMISSION MEDICATION HISTORY
Medication Scribe Admission Medication History    Admission medication history is complete. The information provided in this note is only as accurate as the sources available at the time of the update.    Information Source(s): Patient via in-person    Pertinent Information: Patient states that he takes all of his medication at night and did not take any po medications today.    Changes made to PTA medication list:  Added: Miralax (pert patient)  Deleted: None  Changed: None    Allergies reviewed with patient and updates made in EHR: yes    Medication History Completed By: Shivani Arauz 6/21/2024 9:41 PM    PTA Med List   Medication Sig Last Dose    albuterol (PROAIR HFA/PROVENTIL HFA/VENTOLIN HFA) 108 (90 Base) MCG/ACT inhaler 1 puff Unknown at prn    atorvastatin (LIPITOR) 20 MG tablet Take 20 mg by mouth 6/20/2024 at hs    escitalopram (LEXAPRO) 10 MG tablet Take 10 mg by mouth at bedtime 6/20/2024 at hs    gabapentin (NEURONTIN) 100 MG capsule 100mg in the morning and afternoon and 200mg at bedtime 6/20/2024 at hs    polyethylene glycol (MIRALAX) 17 g packet Take 1 packet by mouth at bedtime 6/20/2024 at hs    umeclidinium-vilanterol (ANORO ELLIPTA) 62.5-25 MCG/ACT oral inhaler 1 puff 6/21/2024 at am

## 2024-06-22 NOTE — DISCHARGE SUMMARY
[facility]  Hospitalist Discharge Summary      Date of Admission:  6/21/2024  Date of Discharge:  6/22/2024  Discharging Provider: Caty Somers PA-C  Discharge Service: Hospitalist Service    Discharge Diagnoses   Gastritis - PPI/sucralfate; follow-up outpatient GI in 2-4 weeks   Duodenopathy   Asymptomatic Sinus Bradycardia - recommending PCP follow-up   Chronic thoracic radicular pain   Anxiety   COPD    Clinically Significant Risk Factors     # Overweight: Estimated body mass index is 29.54 kg/m  as calculated from the following:    Height as of this encounter: 1.829 m (6').    Weight as of this encounter: 98.8 kg (217 lb 12.8 oz).       Follow-ups Needed After Discharge   Follow-up Appointments     Follow-up and recommended labs and tests       Follow up with primary care provider, Prasanth New, within 7 days   for hospital follow- up. Please discuss the following with your provider:   bradycardia (low heart rate) noted through hospital course     Follow-up with gastroenterology as directed in 2-4 weeks.          Discharge Disposition   Discharged to home  Condition at discharge: Stable    Hospital Course   Alexi Bruner is a 70 yo M with h/o COPD, HLD, previous Etoh dep in remission, and chronic lower thoracic back pain radiating to RUQ who presents with 1 week of new LUQ abd pain radiating to epig/RUQ area, post-prandial bloating, and black stools.  Patient does history of H. Pylori. Patient underwent EGD 6/22/2024 which showed gastritis and duodenopathy. Cleared for discharge from GI with plan for PPI daily and follow-up with GI clinic in 2-4 weeks.  Tolerated advancing diet; discharged home in stable condition.      Gastritis  Duodenopathy  Reported black tarry stools x 1 week with associated with new onset abd pain LUQ radiating across epig area to RUQ and also post-prandial bloating  -- Hgb within normal limits and stable  -- S/p EGD 6/22 showing gastritis and duodenopathy    -- Rectal exam following EGD showed brown stool per GI report  --Appreciate GI recs   -- PPI daily    -- follow-up clinic in 2-4 weeks   -- Tolerated advancing diet      Sinus Bradycardia   Heart rate in upper 40s-50s throughout hospital course  -- unclear etiology, appears to be new based on review of vitals from previous clinic visits.    -- Completely asymptomatic aside from reported of increased fatigue over the past days-weeks  -- EKG does not show any acute changes otherwise.    -- Trop negative.    -- Stable on telemetry overnight  --PCP follow-up; consideration of monitor per PCP     Chronic lower thoracic pain radiating to RUQ  -- this has been going on for a couple of years   -- has had workup with MRI's and CT's and PM&R/spine without obvious etiology  -- continue neurontin as at home     Anxiety  -- continue Lexapro as at home     Hyperlipidemia  -- continue lipitor as at home     COPD (chronic obstructive pulmonary disease)  -- continue Anoro ellipta as at home (rarely ever uses albuterol)     Alcohol dependence in remission since 2021  -- used to drink 4-5 beers/day for many years  -- no obvious h/o liver issues. Will just follow for now.    Consultations This Hospital Stay   GASTROENTEROLOGY IP CONSULT  CARE MANAGEMENT / SOCIAL WORK IP CONSULT    Code Status   Full Code    Time Spent on this Encounter   I, Caty Somers PA-C, personally saw the patient today and spent greater than 30 minutes discharging this patient.     This case was discussed with the Attending Physician, Dr. Julia Portillo, who independently met with and assessed the patient and who is in agreement with the disposition and discharge.    Caty Somers PA-C  [facility]  [address] [place] [id number]  Phone: [phone]  Fax: [phone]  ______________________________________________________________________    Physical Exam   Vital Signs: Temp: 98.1  F (36.7  C) Temp  src: Oral BP: 111/67 Pulse: (!) 48   Resp: 16 SpO2: 92 % O2 Device: None (Room air) Oxygen Delivery: 3 LPM  Weight: 217 lbs 12.8 oz    Constitutional: awake, alert, no apparent distress, and appears stated age  Respiratory: No increased work of breathing, no accessory muscle use  Skin: Normal skin color, texture, turgor. No rashes and no jaundice  Neurologic: Awake, alert.   Neuropsychiatric: Appropriate mood, affect and eye contact. Cooperative.       Primary Care Physician   Prasanth New    Discharge Orders      Reason for your hospital stay    Gastritis   Duodenopathy  Asymptomatic Sinus Bradycardia     Follow-up and recommended labs and tests     Follow up with primary care provider, Prasanth New, within 7 days for hospital follow- up. Please discuss the following with your provider: bradycardia (low heart rate) noted through hospital course     Follow-up with gastroenterology as directed in 2-4 weeks.     Activity    Your activity upon discharge: activity as tolerated     Diet    Follow this diet upon discharge: Regular Diet Adult       Significant Results and Procedures   Most Recent 3 CBC's:  Recent Labs   Lab Test 06/22/24  0558 06/21/24  1841   WBC 5.9 7.2   HGB 14.9 16.6   MCV 91 92    237     Most Recent 3 BMP's:  Recent Labs   Lab Test 06/22/24  0558 06/21/24  1841 06/10/24  1209    141 139   POTASSIUM 4.0 4.5 4.7   CHLORIDE 107 106 105   CO2 25 23 22   BUN 10.3 11.9 13.0   CR 0.86 0.88 0.91   ANIONGAP 9 12 12   BUTCH 8.7* 9.4 9.7   GLC 97 95 102*     Most Recent 3 Hemoglobins:  Recent Labs   Lab Test 06/22/24  0558 06/21/24  1841   HGB 14.9 16.6   ,   Results for orders placed or performed during the hospital encounter of 06/21/24   CTA Abdomen Pelvis with Contrast    Narrative    EXAM: CTA ABDOMEN PELVIS WITH CONTRAST  LOCATION: [facility]  DATE: 6/21/2024    INDICATION: Upper Abdominal Pain melena  COMPARISON: CT abdomen and pelvis  3/24/2023  TECHNIQUE: CT angiogram abdomen pelvis during arterial phase of injection of IV contrast. 2D and 3D MIP reconstructions were performed by the CT technologist. Dose reduction techniques were used.  CONTRAST: isovue 370 90ml    FINDINGS:  ANGIOGRAM ABDOMEN/PELVIS: Atherosclerotic disease abdominal aorta and iliac arteries. Infrarenal abdominal aorta upper range of normal in size measuring 2.9 x 2.7 cm. No evidence for dissection. The celiac trunk, superior mesenteric artery, inferior   mesenteric artery, and both renal arteries are patent without significant narrowing. No evidence for extravasation of contrast into the GI tract.    LOWER CHEST: Tiny groundglass nodules visualized lower lung fields.    HEPATOBILIARY: Normal.    PANCREAS: Normal.    SPLEEN: Normal.    ADRENAL GLANDS: Normal.    KIDNEYS/BLADDER: Normal.    BOWEL: No evidence for bowel obstruction. No bowel wall thickening or inflammatory changes. No evidence for acute hemorrhage in the GI tract.     LYMPH NODES: Normal.    PELVIC ORGANS: Normal.    MUSCULOSKELETAL: Normal.      Impression    IMPRESSION:  1.  No evidence for acute GI bleed.  2.  Mild atherosclerotic disease.  3.  Tiny groundglass nodules both lower lung fields, nonspecific, but could represent pneumonia.   4.  No other findings to account for the patient's symptoms.       Discharge Medications   Current Discharge Medication List        START taking these medications    Details   pantoprazole (PROTONIX) 40 MG EC tablet Take 1 tablet (40 mg) by mouth every morning (before breakfast)  Qty: 60 tablet, Refills: 1    Associated Diagnoses: Epigastric pain      sucralfate (CARAFATE) 1 GM tablet Take 1 tablet (1 g) by mouth 4 times daily (before meals and nightly)  Qty: 120 tablet, Refills: 1    Associated Diagnoses: Epigastric pain           CONTINUE these medications which have NOT CHANGED    Details   albuterol (PROAIR HFA/PROVENTIL HFA/VENTOLIN HFA) 108 (90 Base) MCG/ACT inhaler 1  puff      atorvastatin (LIPITOR) 20 MG tablet Take 20 mg by mouth      escitalopram (LEXAPRO) 10 MG tablet Take 10 mg by mouth at bedtime      gabapentin (NEURONTIN) 100 MG capsule 100mg in the morning and afternoon and 200mg at bedtime  Qty: 120 capsule, Refills: 1    Associated Diagnoses: Radicular pain of thoracic region; Myofascial pain      polyethylene glycol (MIRALAX) 17 g packet Take 1 packet by mouth at bedtime      umeclidinium-vilanterol (ANORO ELLIPTA) 62.5-25 MCG/ACT oral inhaler 1 puff           Allergies   Allergies   Allergen Reactions    Bupropion Other (See Comments)

## 2024-06-22 NOTE — CONSULTS
Care Management Initial Consult    General Information  Assessment completed with: Patient,    Type of CM/SW Visit: Initial Assessment    Primary Care Provider verified and updated as needed: Yes   Readmission within the last 30 days: no previous admission in last 30 days      Reason for Consult: discharge planning  Advance Care Planning: Advance Care Planning Reviewed: questions answered          Communication Assessment  Patient's communication style: spoken language (English or Bilingual)    Hearing Difficulty or Deaf: no   Wear Glasses or Blind: yes    Cognitive  Cognitive/Neuro/Behavioral: WDL                      Living Environment:   People in home: child(stu), adult, spouse  Jules Chatman  Current living Arrangements: house      Able to return to prior arrangements: yes       Family/Social Support:  Care provided by: self  Provides care for: no one  Marital Status:   Wife  Lurdes       Description of Support System: Supportive, Involved         Current Resources:   Patient receiving home care services: No     Community Resources: None  Equipment currently used at home: none  Supplies currently used at home: None    Employment/Financial:  Employment Status:          Financial Concerns: none   Referral to Financial Worker: No       Does the patient's insurance plan have a 3 day qualifying hospital stay waiver?  No    Lifestyle & Psychosocial Needs:  Social Determinants of Health     Food Insecurity: Not on file   Depression: Not at risk (7/27/2023)    PHQ-2     PHQ-2 Score: 0   Housing Stability: Not on file   Tobacco Use: High Risk (3/7/2024)    Patient History     Smoking Tobacco Use: Every Day     Smokeless Tobacco Use: Never     Passive Exposure: Not on file   Financial Resource Strain: Not on file   Alcohol Use: Not on file   Transportation Needs: Not on file   Physical Activity: Not on file   Interpersonal Safety: Not on file   Stress: Not on file   Social Connections: Not on file   Health  Literacy: Not on file       Functional Status:  Prior to admission patient needed assistance:   Dependent ADLs:: Independent  Dependent IADLs:: Independent       Mental Health Status:          Chemical Dependency Status:                Values/Beliefs:  Spiritual, Cultural Beliefs, Zoroastrian Practices, Values that affect care:                 Additional Information:  Assessed. Pt lives in a house with his adult son and wife, Lurdes. Independent with ADLs and IADLs. Likely no CM needs. FRANK explained, pt signed, copy given to pt and also placed in pt chart.    Care Management Discharge Note    Discharge Date: 06/22/2024       Discharge Disposition:  home    Discharge Services:  none    Discharge DME:  none    Discharge Transportation: family or friend will provide    Private pay costs discussed: Not applicable    Does the patient's insurance plan have a 3 day qualifying hospital stay waiver?  No    PAS Confirmation Code:    Patient/family educated on Medicare website which has current facility and service quality ratings:      Education Provided on the Discharge Plan:    Persons Notified of Discharge Plans: yes  Patient/Family in Agreement with the Plan:      Handoff Referral Completed: Yes    Additional Information:  Pt adequate for discharge. Discharge orders are in and no CM needs identified. CM will sign off.    Mary Emerson RN

## 2024-06-22 NOTE — CONSULTS
[facility] DIGESTIVE HEALTH CONSULTATION    Alexi Bruner   [address]  [facility] [address]  69 year old male    Admission Date/Time: 6/21/2024  5:34 PM    Primary Care Provider:  Prasanth New    Requesting Physician: Julia Portillo MD    CHIEF COMPLAINT:   black tarry stool and epigastric abdominal pain    REASON FOR THE CONSULT:  melena    HPI:   Alexi Bruner is a 69 year old male with history of COPD, who presents with two week history of epigastric and LUQ abdominal pain.  He reports that he did have similar pain a year ago, and had upper endoscopy at [facility] in April 2023, and was diagnosed with H. pylori.  Report of endoscopy reported normal stomach, and duodenopathy.  Biopsies were positive H. pylori.  He was treated and was checked recently with a stool test off PPI by PCP and confirmed absence of H. pylori.  Reports that his pain is described as burning, persistent, worsening with eating, better with sleeping on his left side, no associated nausea or vomiting.  Stool became black tarry over the past 2 weeks.  Confirmed no NSAIDs use.  No aspirin use.  No blood thinners.  No nausea or vomiting.  He does not use PPI.  Hemoglobin is normal on presentation at 14.9.  CTA abdomen pelvis showed no GI pathology and no evidence of active bleeding.      REVIEW OF SYSTEMS:  Review of Systems   Gastrointestinal:  Positive for abdominal pain and melena. Negative for blood in stool, constipation, nausea and vomiting.       PAST MEDICAL HISTORY:  Past Medical History:   Diagnosis Date    Alcohol dependence in remission since 2021 06/21/2024    Anxiety     Compression fracture of L1 vertebra with routine healing 06/01/2022    COPD (chronic obstructive pulmonary disease) (H) 06/21/2024    Hyperlipidemia        PAST SURGICAL HISTORY:  Past Surgical History:   Procedure Laterality Date    ARTHROSCOPY SHOULDER ROTATOR CUFF REPAIR, BICEP TENODESIS REPAIR Right     HERNIORRHAPHY INGUINAL  2004    LUMBAR DISCECTOMY  1995    L5     VASECTOMY  1986       FAMILY HISTORY:  Family History   Problem Relation Age of Onset    Hypertension Mother     Asthma Father     Hypertension Father     Diabetes Paternal Uncle        SOCIAL HISTORY:  Social History     Tobacco Use    Smoking status: Every Day     Current packs/day: 0.50     Average packs/day: 1.2 packs/day for 50.5 years (59.2 ttl pk-yrs)     Types: Cigarettes     Start date: 1974    Smokeless tobacco: Never   Substance Use Topics    Alcohol use: Not Currently     Alcohol/week: 30.0 standard drinks of alcohol     Types: 30 Cans of beer per week     Comment: 4-5 beers per day for many years quit 2021       ALLERGIES/SENSITIVITIES:  Bupropion    MEDICATIONS:  No current outpatient medications on file.         PHYSICAL EXAM:  BP (!) 142/78 (BP Location: Left arm)   Pulse 54   Temp 98  F (36.7  C) (Oral)   Resp 18   Ht 1.829 m (6')   Wt 98.8 kg (217 lb 12.8 oz)   SpO2 93%   BMI 29.54 kg/m    Body mass index is 29.54 kg/m .  General: A&O, NAD, non-toxic appearing  Eyes: no icterus or conjunctivitis  ENT: oropharynx clear without ulcerations  Neck/Thyroid: supple, no masses  Pulmonary: CTA B  Cardiovascular: RR, S1, S2  Gastrointestinal: Soft, epigastric and left upper quadrant abdominal tenderness  Skin: no jaundice/petechiae/rashes  Lymph nodes: No cervical or supraclavicular lymphadenopathy  Extremities: No edema      LABORATORY DATA:  CBC:  Recent Labs   Lab Test 06/22/24 0558   WBC 5.9   RBC 4.83   HGB 14.9   HCT 43.8   MCV 91   MCH 30.8   MCHC 34.0   RDW 13.2           BMP:  Recent Labs   Lab 06/22/24  0558 06/21/24  1841    141   POTASSIUM 4.0 4.5   CHLORIDE 107 106   CO2 25 23   GLC 97 95   CR 0.86 0.88   BUN 10.3 11.9       INR:  Recent Labs   Lab Test 06/21/24  1841   INR 1.00       Liver and Pancreas panel:  Recent Labs   Lab 06/21/24 1841   AST 26   ALT 35   ALKPHOS 138   BILITOTAL 0.7         IMAGING:    CTA Abdomen Pelvis with Contrast    Result Date:  6/21/2024  EXAM: CTA ABDOMEN PELVIS WITH CONTRAST LOCATION: [facility] DATE: 6/21/2024 INDICATION: Upper Abdominal Pain melena COMPARISON: CT abdomen and pelvis 3/24/2023 TECHNIQUE: CT angiogram abdomen pelvis during arterial phase of injection of IV contrast. 2D and 3D MIP reconstructions were performed by the CT technologist. Dose reduction techniques were used. CONTRAST: isovue 370 90ml FINDINGS: ANGIOGRAM ABDOMEN/PELVIS: Atherosclerotic disease abdominal aorta and iliac arteries. Infrarenal abdominal aorta upper range of normal in size measuring 2.9 x 2.7 cm. No evidence for dissection. The celiac trunk, superior mesenteric artery, inferior mesenteric artery, and both renal arteries are patent without significant narrowing. No evidence for extravasation of contrast into the GI tract. LOWER CHEST: Tiny groundglass nodules visualized lower lung fields. HEPATOBILIARY: Normal. PANCREAS: Normal. SPLEEN: Normal. ADRENAL GLANDS: Normal. KIDNEYS/BLADDER: Normal. BOWEL: No evidence for bowel obstruction. No bowel wall thickening or inflammatory changes. No evidence for acute hemorrhage in the GI tract. LYMPH NODES: Normal. PELVIC ORGANS: Normal. MUSCULOSKELETAL: Normal.     IMPRESSION: 1.  No evidence for acute GI bleed. 2.  Mild atherosclerotic disease. 3.  Tiny groundglass nodules both lower lung fields, nonspecific, but could represent pneumonia. 4.  No other findings to account for the patient's symptoms.      CC: [facility], Prasanth New    ASSESSMENT AND RECOMMENDATIONS:   Alexi Bruner  is a 69 year old male with:  Epigastric abdominal pain.  Melena.  Normal Hb.  No NSAID's use.  Hx of H pylori, negative stool antigen over the past month, off PPI.  Negative CTA.  Sinus bradycardia, asymptomatic.    Ddx peptic ulcer disease, GERD and esophagitis, recurrence of H. pylori with false negative stool antigen testing, versus others.    Recommendations:  Continue with IV  PPI.  Keep NPO.  Referral for upper endoscopy to be done today with conscious sedation.  Risks and benefits of the procedure were discussed.  Patient provided verbal consent for the procedure.  Further recommendations per the endoscopy findings.              Bryan Abarca MD  Thank you for the opportunity to participate in the care of this patient.   Please feel free to contact us with any questions or concerns.  Phone number [phone] ext [phone].

## 2024-06-22 NOTE — PLAN OF CARE
Problem: Adult Inpatient Plan of Care  Goal: Absence of Hospital-Acquired Illness or Injury  Outcome: Progressing  Intervention: Identify and Manage Fall Risk  Recent Flowsheet Documentation  Taken 6/22/2024 0005 by Juliane Zepeda RN  Safety Promotion/Fall Prevention:   activity supervised   lighting adjusted   increased rounding and observation   clutter free environment maintained  Intervention: Prevent Skin Injury  Recent Flowsheet Documentation  Taken 6/22/2024 0005 by Juliane Zepeda RN  Body Position: position changed independently  Skin Protection: adhesive use limited  Device Skin Pressure Protection: adhesive use limited  Intervention: Prevent Infection  Recent Flowsheet Documentation  Taken 6/22/2024 0005 by Juliane Zepeda RN  Infection Prevention: cohorting utilized  Goal: Optimal Comfort and Wellbeing  Outcome: Progressing  Intervention: Monitor Pain and Promote Comfort  Recent Flowsheet Documentation  Taken 6/22/2024 0056 by Juliane Zepeda RN  Pain Management Interventions: medication (see MAR)     Problem: Pain Acute  Goal: Optimal Pain Control and Function  Outcome: Progressing  Intervention: Develop Pain Management Plan  Recent Flowsheet Documentation  Taken 6/22/2024 0056 by Juliane Zepeda RN  Pain Management Interventions: medication (see MAR)  Intervention: Prevent or Manage Pain  Recent Flowsheet Documentation  Taken 6/22/2024 0005 by Juliane Zepeda RN  Medication Review/Management: medications reviewed   Goal Outcome Evaluation:  Pt was admitted for abdomina pain. NPO since midnight. VSS, except heart rate dips into 42. Notify provider if heart rate less than 40. On RA. Tele: sinus gayathri. R-PIV IVF NS at 100 ml/hr. Independent. GI following.

## 2024-06-22 NOTE — PLAN OF CARE
"PRIMARY DIAGNOSIS: \"GENERIC\" NURSING  OUTPATIENT/OBSERVATION GOALS TO BE MET BEFORE DISCHARGE:  ADLs back to baseline: Yes    Activity and level of assistance: Ambulating independently.    Pain status: Improved but still requiring IV narcotics.    Return to near baseline physical activity: Yes     Discharge Planner Nurse   Safe discharge environment identified: Yes  Barriers to discharge: Yes       Entered by: Juliane Zepeda RN 06/22/2024 3:56 AM     Please review provider order for any additional goals.   Nurse to notify provider when observation goals have been met and patient is ready for discharge.Goal Outcome Evaluation:                        "

## 2024-06-25 LAB
PATH REPORT.COMMENTS IMP SPEC: NORMAL
PATH REPORT.FINAL DX SPEC: NORMAL
PATH REPORT.GROSS SPEC: NORMAL
PATH REPORT.MICROSCOPIC SPEC OTHER STN: NORMAL
PATH REPORT.RELEVANT HX SPEC: NORMAL
PHOTO IMAGE: NORMAL

## 2024-06-25 PROCEDURE — 88342 IMHCHEM/IMCYTCHM 1ST ANTB: CPT | Mod: 26 | Performed by: PATHOLOGY

## 2024-06-25 PROCEDURE — 88305 TISSUE EXAM BY PATHOLOGIST: CPT | Mod: 26 | Performed by: PATHOLOGY

## 2024-10-10 DIAGNOSIS — M54.14 RADICULAR PAIN OF THORACIC REGION: ICD-10-CM

## 2024-10-10 DIAGNOSIS — M79.18 MYOFASCIAL PAIN: ICD-10-CM

## 2024-10-10 RX ORDER — GABAPENTIN 100 MG/1
CAPSULE ORAL
Qty: 120 CAPSULE | Refills: 0 | Status: SHIPPED | OUTPATIENT
Start: 2024-10-10

## 2024-10-10 NOTE — TELEPHONE ENCOUNTER
I will provide 1 refill for the patient but will need to be seen in clinic for further refills or can have further refills provided by PCP.

## 2025-02-14 ENCOUNTER — LAB REQUISITION (OUTPATIENT)
Dept: LAB | Facility: CLINIC | Age: 70
End: 2025-02-14
Payer: COMMERCIAL

## 2025-02-14 DIAGNOSIS — G25.81 RESTLESS LEGS SYNDROME: ICD-10-CM

## 2025-02-14 PROCEDURE — 83550 IRON BINDING TEST: CPT | Mod: ORL | Performed by: FAMILY MEDICINE

## 2025-02-14 PROCEDURE — 83540 ASSAY OF IRON: CPT | Mod: ORL | Performed by: FAMILY MEDICINE

## 2025-02-15 LAB
IRON BINDING CAPACITY (ROCHE): 314 UG/DL (ref 240–430)
IRON SATN MFR SERPL: 39 % (ref 15–46)
IRON SERPL-MCNC: 122 UG/DL (ref 61–157)

## 2025-04-14 ENCOUNTER — LAB REQUISITION (OUTPATIENT)
Dept: LAB | Facility: CLINIC | Age: 70
End: 2025-04-14
Payer: COMMERCIAL

## 2025-04-14 DIAGNOSIS — E78.2 MIXED HYPERLIPIDEMIA: ICD-10-CM

## 2025-04-14 LAB
ALBUMIN SERPL BCG-MCNC: 3.9 G/DL (ref 3.5–5.2)
ALP SERPL-CCNC: 123 U/L (ref 40–150)
ALT SERPL W P-5'-P-CCNC: 26 U/L (ref 0–70)
ANION GAP SERPL CALCULATED.3IONS-SCNC: 12 MMOL/L (ref 7–15)
AST SERPL W P-5'-P-CCNC: 21 U/L (ref 0–45)
BILIRUB SERPL-MCNC: 0.6 MG/DL
BUN SERPL-MCNC: 11.8 MG/DL (ref 8–23)
CALCIUM SERPL-MCNC: 9 MG/DL (ref 8.8–10.4)
CHLORIDE SERPL-SCNC: 105 MMOL/L (ref 98–107)
CHOLEST SERPL-MCNC: 156 MG/DL
CREAT SERPL-MCNC: 0.82 MG/DL (ref 0.67–1.17)
EGFRCR SERPLBLD CKD-EPI 2021: >90 ML/MIN/1.73M2
FASTING STATUS PATIENT QL REPORTED: NO
FASTING STATUS PATIENT QL REPORTED: NO
GLUCOSE SERPL-MCNC: 106 MG/DL (ref 70–99)
HCO3 SERPL-SCNC: 22 MMOL/L (ref 22–29)
HDLC SERPL-MCNC: 47 MG/DL
LDLC SERPL CALC-MCNC: 94 MG/DL
NONHDLC SERPL-MCNC: 109 MG/DL
POTASSIUM SERPL-SCNC: 4 MMOL/L (ref 3.4–5.3)
PROT SERPL-MCNC: 6.9 G/DL (ref 6.4–8.3)
SODIUM SERPL-SCNC: 139 MMOL/L (ref 135–145)
TRIGL SERPL-MCNC: 73 MG/DL

## 2025-04-14 PROCEDURE — 80053 COMPREHEN METABOLIC PANEL: CPT | Mod: ORL | Performed by: FAMILY MEDICINE

## 2025-04-14 PROCEDURE — 80061 LIPID PANEL: CPT | Mod: ORL | Performed by: FAMILY MEDICINE

## 2025-05-17 ENCOUNTER — HOSPITAL ENCOUNTER (OUTPATIENT)
Dept: CT IMAGING | Facility: HOSPITAL | Age: 70
Discharge: HOME | End: 2025-05-17
Attending: PHYSICIAN ASSISTANT | Admitting: PHYSICIAN ASSISTANT
Payer: COMMERCIAL

## 2025-05-17 ENCOUNTER — HOSPITAL ENCOUNTER (EMERGENCY)
Facility: CLINIC | Age: 70
Discharge: HOME | End: 2025-05-17
Attending: EMERGENCY MEDICINE | Admitting: EMERGENCY MEDICINE
Payer: COMMERCIAL

## 2025-05-17 VITALS
HEIGHT: 75 IN | TEMPERATURE: 97.8 F | OXYGEN SATURATION: 96 % | HEART RATE: 63 BPM | WEIGHT: 225 LBS | SYSTOLIC BLOOD PRESSURE: 159 MMHG | RESPIRATION RATE: 18 BRPM | BODY MASS INDEX: 27.98 KG/M2 | DIASTOLIC BLOOD PRESSURE: 79 MMHG

## 2025-05-17 DIAGNOSIS — R10.31 RIGHT LOWER QUADRANT ABDOMINAL PAIN: ICD-10-CM

## 2025-05-17 DIAGNOSIS — R10.9 RIGHT SIDED ABDOMINAL PAIN: ICD-10-CM

## 2025-05-17 LAB
ALBUMIN SERPL BCG-MCNC: 3.7 G/DL (ref 3.5–5.2)
ALBUMIN UR-MCNC: NEGATIVE MG/DL
ALP SERPL-CCNC: 111 U/L (ref 40–150)
ALT SERPL W P-5'-P-CCNC: 21 U/L (ref 0–70)
ANION GAP SERPL CALCULATED.3IONS-SCNC: 10 MMOL/L (ref 7–15)
APPEARANCE UR: CLEAR
AST SERPL W P-5'-P-CCNC: 21 U/L (ref 0–45)
BILIRUB SERPL-MCNC: 0.3 MG/DL
BILIRUB UR QL STRIP: NEGATIVE
BILIRUBIN DIRECT (ROCHE PRO & PURE): 0.12 MG/DL (ref 0–0.45)
BUN SERPL-MCNC: 15 MG/DL (ref 8–23)
CALCIUM SERPL-MCNC: 9.5 MG/DL (ref 8.8–10.4)
CHLORIDE SERPL-SCNC: 109 MMOL/L (ref 98–107)
COLOR UR AUTO: ABNORMAL
CREAT BLD-MCNC: 0.8 MG/DL (ref 0.7–1.2)
CREAT SERPL-MCNC: 0.8 MG/DL (ref 0.67–1.17)
EGFRCR SERPLBLD CKD-EPI 2021: >60 ML/MIN/1.73M2
EGFRCR SERPLBLD CKD-EPI 2021: >90 ML/MIN/1.73M2
ERYTHROCYTE [DISTWIDTH] IN BLOOD BY AUTOMATED COUNT: 13.8 % (ref 10–15)
GLUCOSE SERPL-MCNC: 94 MG/DL (ref 70–99)
GLUCOSE UR STRIP-MCNC: NEGATIVE MG/DL
HCO3 SERPL-SCNC: 24 MMOL/L (ref 22–29)
HCT VFR BLD AUTO: 42.3 % (ref 40–53)
HGB BLD-MCNC: 14.4 G/DL (ref 13.3–17.7)
HGB UR QL STRIP: NEGATIVE
KETONES UR STRIP-MCNC: NEGATIVE MG/DL
LEUKOCYTE ESTERASE UR QL STRIP: NEGATIVE
LIPASE SERPL-CCNC: 36 U/L (ref 13–60)
MCH RBC QN AUTO: 31.3 PG (ref 26.5–33)
MCHC RBC AUTO-ENTMCNC: 34 G/DL (ref 31.5–36.5)
MCV RBC AUTO: 92 FL (ref 78–100)
MUCOUS THREADS #/AREA URNS LPF: PRESENT /LPF
NITRATE UR QL: NEGATIVE
PH UR STRIP: 5.5 [PH] (ref 5–7)
PLATELET # BLD AUTO: 232 10E3/UL (ref 150–450)
POTASSIUM SERPL-SCNC: 4.5 MMOL/L (ref 3.4–5.3)
PROT SERPL-MCNC: 6.5 G/DL (ref 6.4–8.3)
RBC # BLD AUTO: 4.6 10E6/UL (ref 4.4–5.9)
RBC URINE: 2 /HPF
SODIUM SERPL-SCNC: 143 MMOL/L (ref 135–145)
SP GR UR STRIP: 1.03 (ref 1–1.03)
UROBILINOGEN UR STRIP-MCNC: 2 MG/DL
WBC # BLD AUTO: 6.8 10E3/UL (ref 4–11)
WBC URINE: <1 /HPF

## 2025-05-17 PROCEDURE — 74177 CT ABD & PELVIS W/CONTRAST: CPT

## 2025-05-17 PROCEDURE — 81003 URINALYSIS AUTO W/O SCOPE: CPT | Performed by: EMERGENCY MEDICINE

## 2025-05-17 PROCEDURE — 82565 ASSAY OF CREATININE: CPT

## 2025-05-17 PROCEDURE — 250N000011 HC RX IP 250 OP 636: Performed by: PHYSICIAN ASSISTANT

## 2025-05-17 PROCEDURE — 83690 ASSAY OF LIPASE: CPT | Performed by: EMERGENCY MEDICINE

## 2025-05-17 PROCEDURE — 82248 BILIRUBIN DIRECT: CPT | Performed by: EMERGENCY MEDICINE

## 2025-05-17 PROCEDURE — 99283 EMERGENCY DEPT VISIT LOW MDM: CPT

## 2025-05-17 PROCEDURE — 250N000009 HC RX 250: Performed by: PHYSICIAN ASSISTANT

## 2025-05-17 PROCEDURE — 36415 COLL VENOUS BLD VENIPUNCTURE: CPT | Performed by: EMERGENCY MEDICINE

## 2025-05-17 PROCEDURE — 82977 ASSAY OF GGT: CPT | Performed by: EMERGENCY MEDICINE

## 2025-05-17 PROCEDURE — 85014 HEMATOCRIT: CPT | Performed by: EMERGENCY MEDICINE

## 2025-05-17 RX ORDER — DICYCLOMINE HCL 20 MG
20 TABLET ORAL 2 TIMES DAILY
Qty: 20 TABLET | Refills: 0 | Status: SHIPPED | OUTPATIENT
Start: 2025-05-17 | End: 2025-05-27

## 2025-05-17 RX ORDER — IOPAMIDOL 755 MG/ML
90 INJECTION, SOLUTION INTRAVASCULAR ONCE
Status: COMPLETED | OUTPATIENT
Start: 2025-05-17 | End: 2025-05-17

## 2025-05-17 RX ADMIN — SODIUM CHLORIDE 72 ML: 9 INJECTION, SOLUTION INTRAVENOUS at 10:47

## 2025-05-17 RX ADMIN — IOPAMIDOL 90 ML: 755 INJECTION, SOLUTION INTRAVENOUS at 10:46

## 2025-05-17 ASSESSMENT — COLUMBIA-SUICIDE SEVERITY RATING SCALE - C-SSRS
2. HAVE YOU ACTUALLY HAD ANY THOUGHTS OF KILLING YOURSELF IN THE PAST MONTH?: NO
1. IN THE PAST MONTH, HAVE YOU WISHED YOU WERE DEAD OR WISHED YOU COULD GO TO SLEEP AND NOT WAKE UP?: NO
6. HAVE YOU EVER DONE ANYTHING, STARTED TO DO ANYTHING, OR PREPARED TO DO ANYTHING TO END YOUR LIFE?: NO

## 2025-05-17 ASSESSMENT — ACTIVITIES OF DAILY LIVING (ADL)
ADLS_ACUITY_SCORE: 56
ADLS_ACUITY_SCORE: 56

## 2025-05-18 LAB — GGT SERPL-CCNC: 25 U/L (ref 8–61)

## 2025-05-18 NOTE — ED PROVIDER NOTES
EMERGENCY DEPARTMENT ENCOUnter      NAME: Alexi Bruner  AGE: 70 year old male  YOB: 1955  MRN: [id number]  EVALUATION DATE & TIME: 2025  7:49 PM    PCP: Willy Zhao    ED PROVIDER: Donnell Aguilera DO      Chief Complaint   Patient presents with    Abdominal Pain         FINAL IMPRESSION:  1. Right lower quadrant abdominal pain          ED COURSE & MEDICAL DECISION MAKIN:09 PM I met with the patient in room 06, obtained history, performed an initial exam, and discussed options and plan for diagnostics and treatment here in the ED.   9:09 PM I decided that the patient can be discharged.      The patient presented to the emergency department today complaining of right lower quadrant pain.  He states that he has had these symptoms for a year now.  He had an outpatient CT performed earlier today but did not receive results of that.  He states that the symptoms today are really no different than usual.  He has some mild tenderness in this area on exam.  Laboratory testing today has been unremarkable.  His CT from earlier today shows no acute process.  I do not feel that any further testing is necessary at this time.  I have recommended that he follow-up closely with [facility] for ongoing treatment.  The patient is comfortable with this plan.        Medical Decision Making      Discharge. I prescribed additional prescription strength medication(s) as charted. See documentation for any additional details.    MIPS (CTPE, Dental pain, Majano, Sinusitis, Asthma/COPD, Head Trauma): Not Applicable    SEPSIS: None        At the conclusion of the encounter I discussed the results of all of the tests and the disposition. The questions were answered. The patient or family acknowledged understanding and was agreeable with the care plan.       NEW PRESCRIPTIONS STARTED AT TODAY'S ER VISIT  New Prescriptions    DICYCLOMINE (BENTYL) 20 MG TABLET    Take 1 tablet (20 mg) by mouth 2 times daily  "for 10 days.          =================================================================    HPI        Alexi Bruner is a 70 year old male with a pertinent history of COPD who presents to this ED by car for evaluation of abdominal pain    Per patient, he has been having right sided lower abdominal tenderness for the past year. He says the pain is hot/tingly. Pressure seems to subside the pain. His pain today (05/17/2025) is not any worse than normal. He also noted that 30-45 minutes after he eats \"gas builds up\". [facility] had him gets a CT scan today and he had blood work done 1.5 weeks ago with [facility].    He has had no urinary symptoms, nausea, vomiting, chest pain, or difficulties breathing. He did not mention taking anything for his symptoms today. No daily medications were mentioned.     Chart review:  Per Chart Review, the patient was seen on 05/17/2025 at [facility] for a CT of the abdomen/pelvis with contrast. The CT showed no acute findings to explain the patient's right-sided pain.      PAST MEDICAL HISTORY:  Past Medical History:   Diagnosis Date    Alcohol dependence in remission since 2021 06/21/2024    Anxiety     Compression fracture of L1 vertebra with routine healing 06/01/2022    COPD (chronic obstructive pulmonary disease) (H) 06/21/2024    Hyperlipidemia        PAST SURGICAL HISTORY:  Past Surgical History:   Procedure Laterality Date    ARTHROSCOPY SHOULDER ROTATOR CUFF REPAIR, BICEP TENODESIS REPAIR Right     ESOPHAGOSCOPY, GASTROSCOPY, DUODENOSCOPY (EGD), COMBINED N/A 6/22/2024    Procedure: ESOPHAGOGASTRODUODENOSCOPY with BIOPSY;  Surgeon: Bryan Abarca MD;  Location: [facility]    HERNIORRHAPHY INGUINAL  2004    LUMBAR DISCECTOMY  1995    L5    VASECTOMY  1986           CURRENT MEDICATIONS:    dicyclomine (BENTYL) 20 MG tablet  albuterol (PROAIR HFA/PROVENTIL HFA/VENTOLIN HFA) 108 (90 Base) MCG/ACT inhaler  atorvastatin (LIPITOR) 20 MG tablet  escitalopram " "(LEXAPRO) 10 MG tablet  gabapentin (NEURONTIN) 100 MG capsule  pantoprazole (PROTONIX) 40 MG EC tablet  polyethylene glycol (MIRALAX) 17 g packet  sucralfate (CARAFATE) 1 GM tablet  umeclidinium-vilanterol (ANORO ELLIPTA) 62.5-25 MCG/ACT oral inhaler        ALLERGIES:  Allergies   Allergen Reactions    Bupropion Other (See Comments)       FAMILY HISTORY:  Family History   Problem Relation Age of Onset    Hypertension Mother     Asthma Father     Hypertension Father     Diabetes Paternal Uncle        SOCIAL HISTORY:   Social History     Socioeconomic History    Marital status:    Tobacco Use    Smoking status: Every Day     Current packs/day: 0.50     Average packs/day: 1.2 packs/day for 51.4 years (59.7 ttl pk-yrs)     Types: Cigarettes     Start date: 1974    Smokeless tobacco: Never   Substance and Sexual Activity    Alcohol use: Not Currently     Alcohol/week: 30.0 standard drinks of alcohol     Types: 30 Cans of beer per week     Comment: 4-5 beers per day for many years quit 2021    Drug use: Not Currently       VITALS:  Patient Vitals for the past 24 hrs:   BP Temp Temp src Pulse Resp SpO2 Height Weight   05/17/25 1941 (!) 159/79 97.8  F (36.6  C) Oral 63 18 96 % 1.905 m (6' 3\") 102.1 kg (225 lb)       PHYSICAL EXAM    Constitutional:  Well developed, Well nourished,  HENT:  Normocephalic, Atraumatic, Oropharynx moist, Nose normal.   Eyes:  EOMI, Conjunctiva normal, No discharge.   Respiratory:  Normal breath sounds, No respiratory distress, No wheezing, No chest tenderness.   Cardiovascular:  Normal heart rate, Normal rhythm, No murmurs  GI:  Soft, Moderate right lower quadrant tenderness, No left sided tenderness, No guarding, No CVA tenderness.   Musculoskeletal:  No tenderness to palpation or major deformities noted.   Extremities: No lower extremity edema.  Neurologic:  Alert & oriented x 3, No focal deficits noted.   Psychiatric:  Affect normal, Judgment normal, Mood normal.        LAB:  All " pertinent labs reviewed and interpreted.  Results for orders placed or performed during the hospital encounter of 05/17/25   CBC with platelets   Result Value Ref Range    WBC Count 6.8 4.0 - 11.0 10e3/uL    RBC Count 4.60 4.40 - 5.90 10e6/uL    Hemoglobin 14.4 13.3 - 17.7 g/dL    Hematocrit 42.3 40.0 - 53.0 %    MCV 92 78 - 100 fL    MCH 31.3 26.5 - 33.0 pg    MCHC 34.0 31.5 - 36.5 g/dL    RDW 13.8 10.0 - 15.0 %    Platelet Count 232 150 - 450 10e3/uL   Basic metabolic panel   Result Value Ref Range    Sodium 143 135 - 145 mmol/L    Potassium 4.5 3.4 - 5.3 mmol/L    Chloride 109 (H) 98 - 107 mmol/L    Carbon Dioxide (CO2) 24 22 - 29 mmol/L    Anion Gap 10 7 - 15 mmol/L    Urea Nitrogen 15.0 8.0 - 23.0 mg/dL    Creatinine 0.80 0.67 - 1.17 mg/dL    GFR Estimate >90 >60 mL/min/1.73m2    Calcium 9.5 8.8 - 10.4 mg/dL    Glucose 94 70 - 99 mg/dL   Hepatic function panel   Result Value Ref Range    Protein Total 6.5 6.4 - 8.3 g/dL    Albumin 3.7 3.5 - 5.2 g/dL    Bilirubin Total 0.3 <=1.2 mg/dL    Alkaline Phosphatase 111 40 - 150 U/L    AST 21 0 - 45 U/L    ALT 21 0 - 70 U/L    Bilirubin Direct 0.12 0.00 - 0.45 mg/dL   Result Value Ref Range    Lipase 36 13 - 60 U/L   UA with Microscopic reflex to Culture    Specimen: Urine, Clean Catch   Result Value Ref Range    Color Urine Light Yellow Colorless, Straw, Light Yellow, Yellow    Appearance Urine Clear Clear    Glucose Urine Negative Negative mg/dL    Bilirubin Urine Negative Negative    Ketones Urine Negative Negative mg/dL    Specific Gravity Urine 1.033 (H) 1.001 - 1.030    Blood Urine Negative Negative    pH Urine 5.5 5.0 - 7.0    Protein Albumin Urine Negative Negative mg/dL    Urobilinogen Urine 2.0 (A) Normal mg/dL    Nitrite Urine Negative Negative    Leukocyte Esterase Urine Negative Negative    Mucus Urine Present (A) None Seen /LPF    RBC Urine 2 <=2 /HPF    WBC Urine <1 <=5 /HPF         IAlfie, am serving as a scribe to document services personally  performed by Dr. Aguilera based on my observation and the provider's statements to me. I, Donnell Aguilera, DO attest that Alfie Tinoco is acting in a scribe capacity, has observed my performance of the services and has documented them in accordance with my direction.    Donnell Aguilera DO  Emergency Medicine  [facility] EMERGENCY ROOM  [address] [address]  [phone]  Dept: [phone]      Donnell Aguilera DO  05/17/25 4066

## 2025-05-18 NOTE — ED TRIAGE NOTES
"PT is coming in tonight with RLQ ABD pain that has been going on for about a year. Pt states that when eats about 30 min he \"fills up with gas\". PT was seen at a [facility] today and had a CT scan done today. Writer called CT to have [facility] read today's scan.      Triage Assessment (Adult)       Row Name 05/17/25 1943          Triage Assessment    Airway WDL WDL        Respiratory WDL    Respiratory WDL WDL        Skin Circulation/Temperature WDL    Skin Circulation/Temperature WDL WDL        Cardiac WDL    Cardiac WDL WDL        Peripheral/Neurovascular WDL    Peripheral Neurovascular WDL WDL        Cognitive/Neuro/Behavioral WDL    Cognitive/Neuro/Behavioral WDL WDL                     "

## (undated) DEVICE — TUBING SUCTION MEDI-VAC 1/4"X20' N620A

## (undated) DEVICE — SYR 03ML LL W/O NDL 309657

## (undated) DEVICE — CONNECTOR ONE-LINK INJECTION SITE LF 7N8399

## (undated) DEVICE — FORCEP BIOPSY 2.3MM DISP COATED 000388

## (undated) DEVICE — KIT SCOPE CLEANING ENDOSCOPY BX00719705

## (undated) DEVICE — KIT CONNECTOR FOR OLYMPUS ENDOSCOPES DEFENDO 100310

## (undated) DEVICE — CATH SUCTION 14FR W/O CTRL DYND41962

## (undated) DEVICE — SWABCAP DISINFECTANT GREEN CFF10-250

## (undated) DEVICE — SOL WATER IRRIG 500ML BOTTLE 2F7113

## (undated) DEVICE — KIT IV START W/O CATH

## (undated) DEVICE — SUCTION CANISTER 1000ML 65651-510

## (undated) DEVICE — ESU GROUND PAD ADULT REM W/15' CORD E7507DB

## (undated) DEVICE — CANNULA NASAL COMFORT SOFT 25FT 0537

## (undated) DEVICE — BITE BLOCK SCOPE DISP 20 X 27 000429

## (undated) DEVICE — TUBING ENDOGATOR + H2O PORT CO